# Patient Record
Sex: FEMALE | Race: WHITE | NOT HISPANIC OR LATINO | Employment: FULL TIME | URBAN - METROPOLITAN AREA
[De-identification: names, ages, dates, MRNs, and addresses within clinical notes are randomized per-mention and may not be internally consistent; named-entity substitution may affect disease eponyms.]

---

## 2017-02-14 DIAGNOSIS — Z30.40 CONTRACEPTIVE USE: ICD-10-CM

## 2017-05-11 DIAGNOSIS — Z30.40 CONTRACEPTIVE USE: ICD-10-CM

## 2017-05-11 NOTE — TELEPHONE ENCOUNTER
Routed to station .  Will forward to RN and FYI to pharmacy to please route OB-GYN refill requests to Triage OB pool at p [phone] in future.    Thanks!    -Cynthia Peterson  [facility] [place]

## 2017-05-11 NOTE — TELEPHONE ENCOUNTER
Last Filled 02/14/17  Last Qty 84  Last Office Visit 11/30/16    Thanks,  Alisia Alcaraz  Certified Pharmacy Technician  [facility]  [phone]

## 2017-05-11 NOTE — TELEPHONE ENCOUNTER
Last annual visit 11/30/16  BP elevated.  Last refill on BCP 2/14/17  BP Readings from Last 4 Encounters:   11/30/16 144/85   06/10/16 152/80   03/16/15 131/81   10/01/14 123/64     Please review and advise on refill  Thank you.    Aydee Mckee   [facility]  RN

## 2017-10-26 DIAGNOSIS — Z30.40 CONTRACEPTIVE USE: ICD-10-CM

## 2017-10-26 DIAGNOSIS — Z30.41 ENCOUNTER FOR SURVEILLANCE OF CONTRACEPTIVE PILLS: Primary | ICD-10-CM

## 2017-10-26 NOTE — TELEPHONE ENCOUNTER
[facility]      Last Written Prescription Date: 5-11-17  Last Fill Quantity: 84,  # refills: 1   Last Office Visit with [facility], [facility] or [facility] prescribing provider: 11-30-16    [facility]  Pharmacy [facility]  On Behalf of [facility]

## 2018-01-03 ENCOUNTER — MYC MEDICAL ADVICE (OUTPATIENT)
Dept: OBGYN | Facility: CLINIC | Age: 28
End: 2018-01-03

## 2018-01-03 DIAGNOSIS — Z30.41 ENCOUNTER FOR SURVEILLANCE OF CONTRACEPTIVE PILLS: ICD-10-CM

## 2018-09-25 ENCOUNTER — HOSPITAL ENCOUNTER (EMERGENCY)
Facility: CLINIC | Age: 28
Discharge: HOME | End: 2018-09-25
Attending: NURSE PRACTITIONER | Admitting: NURSE PRACTITIONER
Payer: COMMERCIAL

## 2018-09-25 VITALS
OXYGEN SATURATION: 97 % | SYSTOLIC BLOOD PRESSURE: 153 MMHG | HEART RATE: 74 BPM | TEMPERATURE: 97.9 F | DIASTOLIC BLOOD PRESSURE: 83 MMHG

## 2018-09-25 DIAGNOSIS — J20.9 ACUTE BRONCHITIS, UNSPECIFIED ORGANISM: ICD-10-CM

## 2018-09-25 PROCEDURE — G0463 HOSPITAL OUTPT CLINIC VISIT: HCPCS | Performed by: NURSE PRACTITIONER

## 2018-09-25 PROCEDURE — 99214 OFFICE O/P EST MOD 30 MIN: CPT | Mod: Z6 | Performed by: NURSE PRACTITIONER

## 2018-09-25 RX ORDER — PREDNISONE 20 MG/1
TABLET ORAL
Qty: 10 TABLET | Refills: 0 | Status: SHIPPED | OUTPATIENT
Start: 2018-09-25 | End: 2019-05-07

## 2018-09-25 RX ORDER — ALBUTEROL SULFATE 90 UG/1
2 AEROSOL, METERED RESPIRATORY (INHALATION) EVERY 4 HOURS PRN
Qty: 1 INHALER | Refills: 0 | Status: SHIPPED | OUTPATIENT
Start: 2018-09-25 | End: 2019-03-12

## 2018-09-25 ASSESSMENT — ENCOUNTER SYMPTOMS
CHILLS: 0
COUGH: 1
HEADACHES: 0
FATIGUE: 0
WHEEZING: 1
FEVER: 0
SHORTNESS OF BREATH: 0
ABDOMINAL PAIN: 0
SORE THROAT: 0

## 2018-09-25 NOTE — ED PROVIDER NOTES
"  History     Chief Complaint   Patient presents with     Cough     cough and wheezing     HPI  Gardenia Marcano is a 28 year old female who presents to urgent care for evaluation of cough and wheezing.  Symptoms started 1 week ago.  Patient initially thought she was getting better 2 days ago, but then states she felt like she was \"hit by a ton of bricks\" yesterday.  Denies fever chills.  Denies shortness of breath.  Reports nonproductive cough, having coughing jags.  Patient reports similar illness 2 years ago and was given a prescription for an inhaler and thought it was very helpful.  No current treatments tried.    Problem List:    Patient Active Problem List    Diagnosis Date Noted     Depression 10/01/2014     Priority: Medium     wellbutrin no help  prozac  counseling       Contraceptive use 10/01/2014     Priority: Medium     Didn't like the patch  OCPs       Elevated blood pressure reading without diagnosis of hypertension 11/20/2013     Priority: Medium        Past Medical History:    Past Medical History:   Diagnosis Date     Chickenpox        Past Surgical History:    Past Surgical History:   Procedure Laterality Date     MOUTH SURGERY      wisdom teeth       Family History:    Family History   Problem Relation Age of Onset     HEART DISEASE Mother      MI     Cerebrovascular Disease Mother      mild     Diabetes Father      HEART DISEASE Maternal Grandfather      Diabetes Paternal Grandmother      Cancer Maternal Grandmother      lung     Arthritis Sister        Social History:  Marital Status:  Single [1]  Social History   Substance Use Topics     Smoking status: Never Smoker     Smokeless tobacco: Never Used     Alcohol use Yes      Comment: occas- quit with pregnancy        Medications:      albuterol (PROAIR HFA) 108 (90 Base) MCG/ACT inhaler   predniSONE (DELTASONE) 20 MG tablet   norethindrone-ethinyl estradiol (ORTHO-NOVUM 1-35 TAB,NORTREL 1-35 TAB) 1-35 MG-MCG per tablet   orlistat (XENICAL) 120 " MG capsule   [DISCONTINUED] albuterol (ALBUTEROL) 108 (90 BASE) MCG/ACT inhaler         Review of Systems   Constitutional: Negative for chills, fatigue and fever.   HENT: Positive for congestion. Negative for ear pain and sore throat.    Respiratory: Positive for cough and wheezing. Negative for shortness of breath.    Cardiovascular: Negative for chest pain.   Gastrointestinal: Negative for abdominal pain.   Neurological: Negative for headaches.       Physical Exam   BP: 153/83  Pulse: 74  Temp: 97.9  F (36.6  C)  SpO2: 97 %      Physical Exam    GENERAL APPEARANCE: healthy, alert and no distress  EYES: EOMI, conjunctiva clear  HENT: bilateral ear canals clear, intact, and without inflammation. Right TM normal. Left TM normal. Nose normal.  Oropharynx without ulcers, erythema or lesions  NECK: supple, nontender, no lymphadenopathy  RESP: Bilateral expiratory wheezing throughout.  No rales or rhonchi.  No tachypnea.  No hypoxia.  CV: regular rates and rhythm, normal S1 S2, no murmur noted      ED Course     ED Course     Procedures             No results found for this or any previous visit (from the past 24 hour(s)).    Medications - No data to display    Assessments & Plan (with Medical Decision Making)   History and exam are consistent with a bronchitis.  Likely viral course of illness.  She is a non-smoker.  No fever.  No evidence for pneumonia or need for further imaging.  Patient will be treated with prednisone and an albuterol inhaler for wheezing.  Instructed to stay well-hydrated.  Symptomatic treatment.  Return for fever, chest pain, shortness of breath, or persistent symptoms greater than 5-7 more days.  I have reviewed the nursing notes.    I have reviewed the findings, diagnosis, plan and need for follow up with the patient.      Discharge Medication List as of 9/25/2018  2:39 PM      START taking these medications    Details   predniSONE (DELTASONE) 20 MG tablet Take two tablets (= 40mg) each day for  5 (five) days, Disp-10 tablet, R-0, E-Prescribe             Final diagnoses:   Acute bronchitis, unspecified organism       9/25/2018   [facility] EMERGENCY DEPARTMENT     Mavis Obrien APRN CNP  09/25/18 0512

## 2018-09-25 NOTE — ED AVS SNAPSHOT
[facility]    [address]    [facility] [address]    Phone:  [phone]    Fax:  [phone]                                       Gardenia Marcano   MRN: [id number]    Department:  [facility]   Date of Visit:  9/25/2018           Patient Information     Date Of Birth          1990        Your diagnoses for this visit were:     Acute bronchitis, unspecified organism        You were seen by Mavis Obrien APRN CNP.      Follow-up Information     Follow up with Gardenia Curry DO.    Specialty:  OB/Gyn    Why:  As needed    Contact information:    [address] [address]  [phone]          Discharge Instructions         Prednisone 40 mg daily.  Albuterol inhaler 2 puffs every 4 hours as needed for wheezing, chest tightness, or shortness of breath.  Return for fever, difficulty breathing, chest pain, or persistent symptoms longer than 5-7 more days.    Viral Bronchitis (Adult)    You have a viral bronchitis. Bronchitis is inflammation and swelling of the lining of the lungs. This is often caused by an infection. Symptoms include a dry, hacking cough that is worse at night. The cough may bring up yellow-green mucus. You may also feel short of breath or wheeze. Other symptoms may include tiredness, chest discomfort, and chills.  Bronchitis that is caused by a virus is not treated with antibiotics. Instead, medicines may be given to help relieve symptoms. Symptoms can last up to 2 weeks, although the cough may last much longer.  This illness is contagious during the first few days and is spread through the air by coughing and sneezing, or by direct contact (touching the sick person and then touching your own eyes, nose, or mouth).  Most viral illnesses resolve within 10 to 14 days with rest and simple home remedies, although they may sometimes last for several weeks.  Home care    If  symptoms are severe, rest at home for the first 2 to 3 days. When you go back to your usual activities, don't let yourself get too tired.    Do not smoke. Also avoid being exposed to secondhand smoke.    You may use over-the-counter medicine to control fever or pain, unless another pain medicine was prescribed. If you have chronic liver or kidney disease or have ever had a stomach ulcer or gastrointestinal bleeding, talk with your healthcare provider before using these medicines. Also talk to your provider if you are taking medicine to prevent blood clots. Aspirin should never be given to anyone younger than 18 years of age who is ill with a viral infection or fever. It may cause severe liver or brain damage.    Your appetite may be poor, so a light diet is fine. Avoid dehydration by drinking 6 to 8 glasses of fluids per day (such as water, soft drinks, sports drinks, juices, tea, or soup). Extra fluids will help loosen secretions in the nose and lungs.    Over-the-counter cough, cold, and sore-throat medicines will not shorten the length of the illness, but they may help to reduce symptoms. Don't use decongestants if you have high blood pressure.  Follow-up care  Follow up with your healthcare provider, or as advised. If you had an X-ray or ECG (electrocardiogram), a specialist will review it. You will be notified of any new findings that may affect your care.  If you are age 65 or older, or if you have a chronic lung disease or condition that affects your immune system, or you smoke, ask your healthcare provider about getting a pneumococcal vaccine and a yearly flu shot (influenza vaccine).  When to seek medical advice  Call your healthcare provider right away if any of these occur:    Fever of 100.4 F (38 C) or higher, or as directed by your healthcare provider    Coughing up increased amounts of colored sputum    Weakness, drowsiness, headache, facial pain, ear pain, or a stiff neck  Call 911  Call 911 if any of  these occur:    Coughing up blood    Worsening weakness, drowsiness, headache, or stiff neck    Trouble breathing, wheezing, or pain with breathing  Date Last Reviewed: 9/13/2015 2000-2017 [employer]. [address] [place] [address], [place] [address]. All rights reserved. This information is not intended as a substitute for professional medical care. Always follow your healthcare professional's instructions.          24 Hour Appointment Hotline       To make an appointment at any [facility] clinic, call [phone] ([phone]). If you don't have a family doctor or clinic, we will help you find one. [facility] clinics are conveniently located to serve the needs of you and your family.             Review of your medicines      START taking        Dose / Directions Last dose taken    predniSONE 20 MG tablet   Commonly known as:  DELTASONE   Quantity:  10 tablet        Take two tablets (= 40mg) each day for 5 (five) days   Refills:  0          Our records show that you are taking the medicines listed below. If these are incorrect, please call your family doctor or clinic.        Dose / Directions Last dose taken    albuterol 108 (90 Base) MCG/ACT inhaler   Commonly known as:  PROAIR HFA   Dose:  2 puff   Quantity:  1 Inhaler        Inhale 2 puffs into the lungs every 4 hours as needed for shortness of breath / dyspnea   Refills:  0        norethindrone-ethinyl estradiol 1-35 MG-MCG per tablet   Commonly known as:  ORTHO-NOVUM 1-35 TAB,NORTREL 1-35 TAB   Dose:  1 tablet   Quantity:  112 tablet        Take 1 tablet by mouth daily   Refills:  6        orlistat 120 MG capsule   Commonly known as:  XENICAL   Dose:  120 mg   Quantity:  90 capsule        Take 1 capsule (120 mg) by mouth 3 times daily (with meals)   Refills:  1                Prescriptions were sent or printed at these locations (2 Prescriptions)                   [facility] [place], [place] - [address]   [address],  [place] [address]    Telephone:  [phone]   Fax:  [phone]   Hours:                  E-Prescribed (2 of 2)         albuterol (PROAIR HFA) 108 (90 Base) MCG/ACT inhaler               predniSONE (DELTASONE) 20 MG tablet                Orders Needing Specimen Collection     None      Pending Results     No orders found from 9/23/2018 to 9/26/2018.            Pending Culture Results     No orders found from 9/23/2018 to 9/26/2018.            Pending Results Instructions     If you had any lab results that were not finalized at the time of your Discharge, you can call the ED Lab Result RN at [phone]. You will be contacted by this team for any positive Lab results or changes in treatment. The nurses are available 7 days a week from 10A to 6:30P.  You can leave a message 24 hours per day and they will return your call.        Test Results From Your Hospital Stay               Thank you for choosing [facility]       Thank you for choosing [facility] for your care. Our goal is always to provide you with excellent care. Hearing back from our patients is one way we can continue to improve our services. Please take a few minutes to complete the written survey that you may receive in the mail after you visit with us. Thank you!        [email or web address] Information     [email or web address] gives you secure access to your electronic health record. If you see a primary care provider, you can also send messages to your care team and make appointments. If you have questions, please call your primary care clinic.  If you do not have a primary care provider, please call [phone] and they will assist you.        Care EveryWhere ID     This is your Care EveryWhere ID. This could be used by other organizations to access your [facility] medical records  [id number]        Equal Access to Services     JOVITA CANNON : aleshia Jerry, mirna gonsalves. So [facility]  [phone].    ATENCIÓN: Si habla español, tiene a goff disposición servicios gratuitos de asistencia lingüística. Llame al [phone].    We comply with applicable federal civil rights laws and [place] laws. We do not discriminate on the basis of race, color, national origin, age, disability, sex, sexual orientation, or gender identity.            After Visit Summary       This is your record. Keep this with you and show to your community pharmacist(s) and doctor(s) at your next visit.

## 2018-09-25 NOTE — ED AVS SNAPSHOT
[facility]    [address] [facility] [address] [address]    Phone:  [phone]    Fax:  [phone]                                       Gardenia Marcano   MRN: [id number]    Department:  [facility]   Date of Visit:  9/25/2018           After Visit Summary Signature Page     I have received my discharge instructions, and my questions have been answered. I have discussed any challenges I see with this plan with the nurse or doctor.    ..........................................................................................................................................  Patient/Patient Representative Signature      ..........................................................................................................................................  Patient Representative Print Name and Relationship to Patient    ..................................................               ................................................  Date                                   Time    ..........................................................................................................................................  Reviewed by Signature/Title    ...................................................              ..............................................  Date                                               Time          22EPIC Rev 08/18

## 2018-09-25 NOTE — DISCHARGE INSTRUCTIONS
Prednisone 40 mg daily.  Albuterol inhaler 2 puffs every 4 hours as needed for wheezing, chest tightness, or shortness of breath.  Return for fever, difficulty breathing, chest pain, or persistent symptoms longer than 5-7 more days.    Viral Bronchitis (Adult)    You have a viral bronchitis. Bronchitis is inflammation and swelling of the lining of the lungs. This is often caused by an infection. Symptoms include a dry, hacking cough that is worse at night. The cough may bring up yellow-green mucus. You may also feel short of breath or wheeze. Other symptoms may include tiredness, chest discomfort, and chills.  Bronchitis that is caused by a virus is not treated with antibiotics. Instead, medicines may be given to help relieve symptoms. Symptoms can last up to 2 weeks, although the cough may last much longer.  This illness is contagious during the first few days and is spread through the air by coughing and sneezing, or by direct contact (touching the sick person and then touching your own eyes, nose, or mouth).  Most viral illnesses resolve within 10 to 14 days with rest and simple home remedies, although they may sometimes last for several weeks.  Home care    If symptoms are severe, rest at home for the first 2 to 3 days. When you go back to your usual activities, don't let yourself get too tired.    Do not smoke. Also avoid being exposed to secondhand smoke.    You may use over-the-counter medicine to control fever or pain, unless another pain medicine was prescribed. If you have chronic liver or kidney disease or have ever had a stomach ulcer or gastrointestinal bleeding, talk with your healthcare provider before using these medicines. Also talk to your provider if you are taking medicine to prevent blood clots. Aspirin should never be given to anyone younger than 18 years of age who is ill with a viral infection or fever. It may cause severe liver or brain damage.    Your appetite may be poor, so a light diet  is fine. Avoid dehydration by drinking 6 to 8 glasses of fluids per day (such as water, soft drinks, sports drinks, juices, tea, or soup). Extra fluids will help loosen secretions in the nose and lungs.    Over-the-counter cough, cold, and sore-throat medicines will not shorten the length of the illness, but they may help to reduce symptoms. Don't use decongestants if you have high blood pressure.  Follow-up care  Follow up with your healthcare provider, or as advised. If you had an X-ray or ECG (electrocardiogram), a specialist will review it. You will be notified of any new findings that may affect your care.  If you are age 65 or older, or if you have a chronic lung disease or condition that affects your immune system, or you smoke, ask your healthcare provider about getting a pneumococcal vaccine and a yearly flu shot (influenza vaccine).  When to seek medical advice  Call your healthcare provider right away if any of these occur:    Fever of 100.4 F (38 C) or higher, or as directed by your healthcare provider    Coughing up increased amounts of colored sputum    Weakness, drowsiness, headache, facial pain, ear pain, or a stiff neck  Call 911  Call 911 if any of these occur:    Coughing up blood    Worsening weakness, drowsiness, headache, or stiff neck    Trouble breathing, wheezing, or pain with breathing  Date Last Reviewed: 9/13/2015 2000-2017 [employer]. [address] [place] [address]. All rights reserved. This information is not intended as a substitute for professional medical care. Always follow your healthcare professional's instructions.

## 2019-03-09 DIAGNOSIS — J20.9 ACUTE BRONCHITIS, UNSPECIFIED ORGANISM: Primary | ICD-10-CM

## 2019-03-11 NOTE — TELEPHONE ENCOUNTER
11/30/16 last office visit with Dr. Roca  Patient in need of refill on her inhaler.  Future appointment currently not scheduled.    Will send to on call provider for consideration. Outside of RN guidelines for refill.    Recommend patient follow up with PCP for additional refills and yearly physical.    Aydee Mckee   [facility] Clinic  RN

## 2019-03-12 RX ORDER — ALBUTEROL SULFATE 90 UG/1
2 AEROSOL, METERED RESPIRATORY (INHALATION) EVERY 4 HOURS PRN
Qty: 1 INHALER | Refills: 0 | Status: SHIPPED | OUTPATIENT
Start: 2019-03-12 | End: 2021-01-12

## 2019-03-12 NOTE — TELEPHONE ENCOUNTER
Will prescribe refill but she will need to be seen by her PCP for an annual exam to further manage this medical problem. Thanks.     Elliott Zhou MD   [facility]

## 2019-05-07 ENCOUNTER — OFFICE VISIT (OUTPATIENT)
Dept: FAMILY MEDICINE | Facility: CLINIC | Age: 29
End: 2019-05-07
Payer: COMMERCIAL

## 2019-05-07 VITALS
HEART RATE: 99 BPM | SYSTOLIC BLOOD PRESSURE: 134 MMHG | HEIGHT: 65 IN | BODY MASS INDEX: 40.32 KG/M2 | RESPIRATION RATE: 16 BRPM | OXYGEN SATURATION: 99 % | TEMPERATURE: 98.2 F | WEIGHT: 242 LBS | DIASTOLIC BLOOD PRESSURE: 68 MMHG

## 2019-05-07 DIAGNOSIS — Z00.00 ROUTINE GENERAL MEDICAL EXAMINATION AT A HEALTH CARE FACILITY: Primary | ICD-10-CM

## 2019-05-07 DIAGNOSIS — E66.01 MORBID OBESITY (H): ICD-10-CM

## 2019-05-07 DIAGNOSIS — Z30.41 ENCOUNTER FOR SURVEILLANCE OF CONTRACEPTIVE PILLS: ICD-10-CM

## 2019-05-07 PROCEDURE — G0476 HPV COMBO ASSAY CA SCREEN: HCPCS | Performed by: FAMILY MEDICINE

## 2019-05-07 PROCEDURE — 99395 PREV VISIT EST AGE 18-39: CPT | Performed by: FAMILY MEDICINE

## 2019-05-07 PROCEDURE — 87624 HPV HI-RISK TYP POOLED RSLT: CPT | Performed by: FAMILY MEDICINE

## 2019-05-07 PROCEDURE — G0145 SCR C/V CYTO,THINLAYER,RESCR: HCPCS | Performed by: FAMILY MEDICINE

## 2019-05-07 ASSESSMENT — MIFFLIN-ST. JEOR: SCORE: 1810.64

## 2019-05-07 NOTE — PATIENT INSTRUCTIONS
Plan next physical in one year for birth control refills to do fasting labs for cholesterol and blood sugar.    Get up a few times a day at work and walk or do steps for 10 mins.  Family activity in the evening: like dance/workout kids [employer], [employer].      Preventive Health Recommendations  Female Ages 26 - 39  Yearly exam:   See your health care provider every year in order to    Review health changes.     Discuss preventive care.      Review your medicines if you your doctor has prescribed any.    Until age 30: Get a Pap test every three years (more often if you have had an abnormal result).    After age 30: Talk to your doctor about whether you should have a Pap test every 3 years or have a Pap test with HPV screening every 5 years.   You do not need a Pap test if your uterus was removed (hysterectomy) and you have not had cancer.  You should be tested each year for STDs (sexually transmitted diseases), if you're at risk.   Talk to your provider about how often to have your cholesterol checked.  If you are at risk for diabetes, you should have a diabetes test (fasting glucose).  Shots: Get a flu shot each year. Get a tetanus shot every 10 years.   Nutrition:     Eat at least 5 servings of fruits and vegetables each day.    Eat whole-grain bread, whole-wheat pasta and brown rice instead of white grains and rice.    Get adequate Calcium and Vitamin D.     Lifestyle    Exercise at least 150 minutes a week (30 minutes a day, 5 days of the week). This will help you control your weight and prevent disease.    Limit alcohol to one drink per day.    No smoking.     Wear sunscreen to prevent skin cancer.    See your dentist every six months for an exam and cleaning.    Patient Education   One pound is 3500 calories.  So to lose one pound per week need to cut out 500 calories per day or 3500 calories per week.  Do not count exercise toward or against your calories.  Write down everything that you eat  and count calories or use an silvana like Lose It or website like Spring.me or MyPlate or Calorie Chris    1200- 1500 Calorie Menu: This is a well balanced healthy 1500 calorie menu so you can follow it for as long as you need!    Day 1    Breakfast - 2 whole grain toast, 1 tablespoon of jelly, 1 teaspoon of butter, 1 cup of tea or coffee,   cup of orange juice.  Snack -   bagel, 1 cup of yogurt.  Lunch - 1 oz of sliced turkey or chicken breast, 1 tossed vegetable salad with 1 tablespoon of olive oil and lemon juice, 1 whole grain roll.  Snack -   cup of fresh strawberries, 1 cup of yogurt, 1 tablespoon of granola cereal.  Dinner - 3 oz of beef, grilled or broiled, 1 cup of rice, 1 teaspoon of butter,   cup of steamed carrots, mixed green salad with olive oil and lemon juice.  Snack - 1 apple or orange.    Day 2    Breakfast - 1 orange, 1 cup of whole grain cereal, 1 cup of milk, 1 cup of strawberries.  Snack - 2 teaspoons of peanut butter, 2 rice cakes.  Lunch - 1 cup of vegetable soup, 1 oz of mozzarella cheese, 1 mixed greens salad with olive oil and lemon juice, 1 cup of yogurt, few whole grain crackers.  Snack - 1 apple.  Dinner - 5 oz of white fish, baked, broiled or grilled, 1 baked potato, 1 cup of steamed broccoli, mixed greens salad with oil and lemon juice, 1 whole grain roll.  Snack - 3 cups of plain popcorn.    Day 3    Breakfast - 2 pancakes with 1 tablespoon of maple syrup or fruit spread.  Snack - 1 cup of milk, 1 peach.  Lunch - 6 oz of fish, grilled or broiled, mixed greens salad with 1 tablespoon of olive oil and lemon juice, 1 apple, and few whole grain crackers.  Snack - 1 granola bar.  Dinner - 2 cup of whole grain pasta,   cup of tomato sauce, mixed greens salad with olive oil and lemon juice.  Snack - 1 cup of milk, few peanuts.    Day 4    Breakfast -   cup of oatmeal, cooked with 1 teaspoons of brown sugar, 1 cup of milk, 1 orange.  Snack - 1 apple, 2 oz of almonds.  Lunch - 1 oz of sliced  chicken or turkey breast, 1 teaspoon of mustard, 1 slices of whole wheat bread, 2 slices of tomato, 1   cup of sliced raw vegetables.  Snack -   cup of milk, 1 cup of strawberries.  Dinner - 3 oz skinless chicken breast, baked, broiled or grilled, 1 medium baked potato, 2 teaspoons of butter, mixed greens salad with 1 tablespoon of olive oil and lemon juice.  Snack - 1/2 cup of cottage cheese, 1 pear.    Day 5    Breakfast - 1 whole wheat bagel, 1 table spoon of cream cheese, 1 cup of orange juice.  Snack - 1 cup of yogurt, 1 apple.  Lunch - 2 oz lean hamburger, grilled or broiled, 1 cup of steamed asparagus, large tossed vegetable salad with yogurt dressing,   cup of cottage cheese.  Dinner - 2 cup of pasta with 3 oz of cooked shrimp and 1 /2 cup of broccoli, 1 slice of Italian bread, 1 teaspoon of garlic olive oil, mixed greens salad with oil and vinegar.    Day 6    Breakfast - 1 poached egg, 1 tomato, 1 whole wheat muffin,   grapefruit.  Snack - 1 cup of fruit salad, 1 cup of yogurt, 1 granola bar.  Lunch - 3 oz of sliced turkey or chicken breast, 1 anabell bread, 1 cup of sliced carrots and celery.  Snack - 1 peach,   cup of cottage cheese.  Dinner - 3 oz of cheese, few crackers, 1 mixed greens salad with olive oil and lemon juice, 1 glass of dry red wine.  Snack - 1 cup of fresh strawberries.    Day 7    Breakfast - 1 whole wheat toast, 1 hard boiled egg,   cup of blueberries, 1 cup of yogurt.  Snack - 1 pear, 1 oz of pretzels.  Lunch - 4 oz of cheese, 1 large vegetable salad with olive oil and lemon juice, 1 cup of milk.  Snack -   cup of fruit salad, 1 granola bar.  Dinner - 3 oz of broiled or baked white fish, 1   cup of rice, 1 cup of steamed vegetables, 2 teaspoons of butter.        Understanding USDA MyPlate  The USDA (U.S. Department of Agriculture) has guidelines to help you make healthy food choices. These are called MyPlate. MyPlate shows the food groups that make up healthy meals using the image of a  place setting. Before you eat, think about the healthiest choices for what to put onto your plate or into your cup or bowl. To learn more about building a healthy plate, visit www.choosemyplate.gov.    The food groups    Fruits. Any fruit or 100% fruit juice counts as part of the Fruit Group. Fruits may be fresh, canned, frozen, or dried, and may be whole, cut-up, or pureed. Make half your plate fruits and vegetables.    Vegetables. Any vegetable or 100% vegetable juice counts as a member of the Vegetable Group. Vegetables may be fresh, frozen, canned, or dried. They can be served raw or cooked and may be whole, cut-up, or mashed. Make half your plate fruits and vegetables.    Grains. All foods made from grains are part of the Grains Group. These include wheat, rice, oats, cornmeal, and barley such as bread, pasta, oatmeal, cereal, tortillas, and grits. Grains should be no more than a quarter of your plate. At least half of your grains should be whole grains.    Protein. This group includes meat, poultry, seafood, beans and peas, eggs, processed soy products (like tofu), nuts (including nut butters), and seeds. Make protein choices no more than a quarter of your plate. Meat and poultry choices should be lean or low fat.    Dairy. All fluid milk products and foods made from milk that contain calcium, like yogurt and cheese, are part of the Dairy Group. (Foods that have little calcium, such as cream, butter, and cream cheese, are not part of the group.) Most dairy choices should be low-fat or fat-free.    Oils. These are fats that are liquid at room temperature. They include canola, corn, olive, soybean, and sunflower oil. Foods that are mainly oil include mayonnaise, certain salad dressings, and soft margarines. You should have only 5 to 7 teaspoons of oils a day. You probably already get this much from the food you eat.  Date Last Reviewed: 8/1/2017 2000-2018 [employer]. [address] [place] [address],  "[place] [address]. All rights reserved. This information is not intended as a substitute for professional medical care. Always follow your healthcare professional's instructions.           Patient Education     MyPlate Worksheet: 1,200 Calories  Your calorie needs are about 1,200 calories a day. Below are the U.S. Department of Agriculture (USDA) guidelines for your daily recommended amount of each food group.  Vegetables  1  cups Fruits  1 cup Grains  4 ounces Dairy  2  cups Protein  3 ounces   Eat a variety of vegetables each day.  Aim for these amounts each week:    1 cup dark green vegetables    3 cups red or orange-colored vegetables      cup dry beans and peas    3  cups starchy vegetables    2  cups other vegetables Eat a variety of fruits each day.  Go easy on fruit juices.  Good choices of fruits include:    Berries    Bananas    Apples    Melon    Dried fruit    Frozen fruit    Canned fruit Choose whole grains whenever you can.  Aim to eat at least 2 ounces of whole grains each day:    Bread    Cereal    Rice    Pasta    Potatoes    Tortillas Choose low-fat or fat-free milk, yogurt, or cheese each day.  Good choices include:    Low-fat or fat-free milk or chocolate milk    Low-fat or fat-free yogurt    Low-fat or fat-free cottage cheese or other reduced-fat cheeses    Calcium-fortified milk alternatives Choose low-fat or lean meats, poultry, fish and seafood each day.  Vary your protein. Choose more:    Fish and other seafood    Lean low-fat meat and poultry    Eggs    Beans, peas    Tofu    Unsalted nuts and seeds  Choose less high-fat and red meat.   Source: USDA MyPlate, www.choosemyplate.gov  Know your limits on oils (fats) and sugars    Your allowance for oils is 17 grams or about 4 teaspoons a day (oil includes vegetable oil, mayonnaise, soft margarine, salad dressing, nuts, olives, avocados, and some fish).    Limit the extras (solid fats and sugars, also called \"empty calories\") to 100 calories a " day.    Cut back on salt (sodium). Stay under 2,300 mg sodium a day. If you have a health condition such as heart disease or high blood pressure, your doctor will likely tell you to limit sodium to no more than 1,500 mg a day.  Get moving and be active!  Aim for at least 30 minutes of physical activity most days of the week or 150 minutes of exercise a week.  MyPlate Servings Worksheet: 1,200 calories  This worksheet tells you how many servings you should get each day from each food group, and tells you how much food makes a serving. Use this as a guide as you plan your meals throughout the day. Track your progress daily by writing in what you actually ate.  Food Group  Daily MyPlate Goal  What You Ate Today    Vegetables 3 Half-cups or 3 Servings  One serving is:    cup cut-up raw or cooked vegetables  1 cup raw, leafy vegetables    baked sweet potato    cup vegetable juice  Note: At meals, fill half your plate with vegetables and fruit and eat them first.     Fruits 2 Half-cups or 2 Servings  One serving is:    cup fresh, frozen, or canned fruit  1 medium piece of fruit  1 cup of berries or melon    cup dried fruit    cup 100% fruit juice  Note: Make most choices fruit instead of juice.     Grains 4 Servings or 4 Ounces  One serving is:  1 slice bread  1 cup dry cereal    cup cooked rice, pasta, or cereal  1 5-inch tortilla  Note: Choose whole grains for at least half of your servings each day.     Dairy 2 Servings or 2 Cups  One serving is:  1 cup milk  1  ounces reduced-fat hard cheese  2 ounces processed cheese  1 cup low-fat yogurt  1/3 cup shredded cheese  Note: Choose low-fat or fat-free most often.     Protein 3 Servings or 3 Ounces  One serving is:  1 ounce cooked lean beef, pork, lamb, or ham  1 ounce cooked chicken or turkey (no skin)  1 ounce cooked fish or shellfish (not fried)  1 egg    cup egg substitute    ounce nuts or seeds  1 tablespoon peanut or almond butter    cup cooked dry beans or peas     cup tofu  2 tablespoons [facility]     Date Last Reviewed: 7/1/2017 2000-2018 [employer]. [address] [place] [address], [place] [address]. All rights reserved. This information is not intended as a substitute for professional medical care. Always follow your healthcare professional's instructions.

## 2019-05-07 NOTE — PROGRESS NOTES
SUBJECTIVE:   CC: Gardenia Marcano is an 28 year old woman who presents for preventive health visit.     Healthy Habits:    Do you get at least three servings of calcium containing foods daily (dairy, green leafy vegetables, etc.)? No- sometimes    Amount of exercise or daily activities, outside of work: 0 day(s) per week. Chases her 4 year old around.    Problems taking medications regularly No    Medication side effects: No    Have you had an eye exam in the past two years? no    Do you see a dentist twice per year? no    Do you have sleep apnea, excessive snoring or daytime drowsiness?no      Today's PHQ-2 Score:   PHQ-2 ( 1999 Pfizer) 11/30/2016 8/5/2014   Q1: Little interest or pleasure in doing things 0 2   Q2: Feeling down, depressed or hopeless 0 2   PHQ-2 Score 0 4       Abuse: Current or Past(Physical, Sexual or Emotional)- No  Do you feel safe in your environment? Yes    Social History     Tobacco Use     Smoking status: Never Smoker     Smokeless tobacco: Never Used   Substance Use Topics     Alcohol use: Yes     Comment: occas- quit with pregnancy     If you drink alcohol do you typically have >3 drinks per day or >7 drinks per week? Not Applicable                     Reviewed orders with patient.  Reviewed health maintenance and updated orders accordingly - Yes  BP Readings from Last 3 Encounters:   05/07/19 134/68   09/25/18 153/83   11/30/16 144/85    Wt Readings from Last 3 Encounters:   05/07/19 109.8 kg (242 lb)   11/30/16 110.8 kg (244 lb 3.2 oz)   03/16/15 113.6 kg (250 lb 6.4 oz)                    Mammogram not appropriate for this patient based on age.    Pertinent mammograms are reviewed under the imaging tab.  History of abnormal Pap smear: NO - age 30-65 PAP every 5 years with negative HPV co-testing recommended  PAP / HPV 11/30/2016 11/20/2013   PAP NIL NIL     Reviewed and updated as needed this visit by clinical staff  Tobacco  Allergies  Meds  Med Hx  Surg Hx  Fam Hx  Soc Hx     "    Reviewed and updated as needed this visit by Provider            ROS:  CONSTITUTIONAL: NEGATIVE for fever, chills, change in weight  INTEGUMENTARU/SKIN: NEGATIVE for worrisome rashes, moles or lesions  EYES: NEGATIVE for vision changes or irritation  ENT: NEGATIVE for ear, mouth and throat problems  RESP: NEGATIVE for significant cough or SOB  BREAST: NEGATIVE for masses, tenderness or discharge  CV: NEGATIVE for chest pain, palpitations or peripheral edema  GI: NEGATIVE for nausea, abdominal pain, heartburn, or change in bowel habits  : NEGATIVE for unusual urinary or vaginal symptoms. No periods with taking continuous birth control.  MUSCULOSKELETAL: NEGATIVE for significant arthralgias or myalgia  NEURO: NEGATIVE for weakness, dizziness or paresthesias  PSYCHIATRIC: NEGATIVE for changes in mood or affect    OBJECTIVE:   /68   Pulse 99   Temp 98.2  F (36.8  C) (Tympanic)   Resp 16   Ht 1.638 m (5' 4.5\")   Wt 109.8 kg (242 lb)   SpO2 99%   BMI 40.90 kg/m    EXAM:  GENERAL: healthy, alert and no distress  EYES: Eyes grossly normal to inspection, PERRL and conjunctivae and sclerae normal  HENT: ear canals and TM's normal, nose and mouth without ulcers or lesions  NECK: no adenopathy, no asymmetry, masses, or scars and thyroid normal to palpation  RESP: lungs clear to auscultation - no rales, rhonchi or wheezes  BREAST: normal without masses, tenderness or nipple discharge and no palpable axillary masses or adenopathy  CV: regular rate and rhythm, normal S1 S2, no S3 or S4, no murmur, click or rub, no peripheral edema and peripheral pulses strong  ABDOMEN: soft, nontender, no hepatosplenomegaly, no masses and bowel sounds normal   (female): normal female external genitalia, normal urethral meatus, vaginal mucosa pink, moist, well rugated, and normal cervix/adnexa/uterus without masses or discharge  MS: no gross musculoskeletal defects noted, no edema  SKIN: no suspicious lesions or rashes  NEURO: " "Normal strength and tone, mentation intact and speech normal  PSYCH: mentation appears normal, affect normal/bright    Diagnostic Test Results:  none     ASSESSMENT/PLAN:   Gardenia was seen today for physical.    Diagnoses and all orders for this visit:    Routine general medical examination at a health care facility  -     Pap imaged thin layer screen with HPV - recommended age 30 - 65 years (select HPV order below)  -     HPV High Risk Types DNA Cervical    Encounter for surveillance of contraceptive pills  -     norethindrone-ethinyl estradiol (ORTHO-NOVUM 1-35 TAB,NORTREL 1-35 TAB) 1-35 MG-MCG tablet; Take 1 tablet by mouth daily Skip placebo.    Morbid obesity (H): discussed.        COUNSELING:   Reviewed preventive health counseling, as reflected in patient instructions       Regular exercise       Healthy diet/nutrition       Vision screening       Contraception       Safe sex practices/STD prevention    BP Readings from Last 1 Encounters:   05/07/19 134/68     Estimated body mass index is 40.9 kg/m  as calculated from the following:    Height as of this encounter: 1.638 m (5' 4.5\").    Weight as of this encounter: 109.8 kg (242 lb).    BP Screening:   Last 3 BP Readings:    BP Readings from Last 3 Encounters:   05/07/19 134/68   09/25/18 153/83   11/30/16 144/85       The following was recommended to the patient:  Re-screen BP within a year and recommended lifestyle modifications  Weight management plan: Discussed healthy diet and exercise guidelines     reports that she has never smoked. She has never used smokeless tobacco.      Counseling Resources:  ATP IV Guidelines  Pooled Cohorts Equation Calculator  Breast Cancer Risk Calculator  FRAX Risk Assessment  ICSI Preventive Guidelines  Dietary Guidelines for Americans, 2010  USDA's MyPlate  ASA Prophylaxis  Lung CA Screening    Eb Suarez MD  [facility]  "

## 2019-05-09 LAB
COPATH REPORT: NORMAL
PAP: NORMAL

## 2019-05-14 LAB
FINAL DIAGNOSIS: NORMAL
HPV HR 12 DNA CVX QL NAA+PROBE: NEGATIVE
HPV16 DNA SPEC QL NAA+PROBE: NEGATIVE
HPV18 DNA SPEC QL NAA+PROBE: NEGATIVE
SPECIMEN DESCRIPTION: NORMAL
SPECIMEN SOURCE CVX/VAG CYTO: NORMAL

## 2019-08-17 ENCOUNTER — MYC REFILL (OUTPATIENT)
Dept: FAMILY MEDICINE | Facility: CLINIC | Age: 29
End: 2019-08-17

## 2019-08-17 DIAGNOSIS — Z30.41 ENCOUNTER FOR SURVEILLANCE OF CONTRACEPTIVE PILLS: ICD-10-CM

## 2019-08-18 ENCOUNTER — MYC REFILL (OUTPATIENT)
Dept: FAMILY MEDICINE | Facility: CLINIC | Age: 29
End: 2019-08-18

## 2019-08-18 DIAGNOSIS — Z30.41 ENCOUNTER FOR SURVEILLANCE OF CONTRACEPTIVE PILLS: ICD-10-CM

## 2019-08-20 NOTE — TELEPHONE ENCOUNTER
Too soon, rx sent 5/7/19 for 1 year. Advised patient to contact pharmacy and speak to a real person.

## 2020-01-23 ENCOUNTER — MYC REFILL (OUTPATIENT)
Dept: FAMILY MEDICINE | Facility: CLINIC | Age: 30
End: 2020-01-23

## 2020-01-23 DIAGNOSIS — Z30.41 ENCOUNTER FOR SURVEILLANCE OF CONTRACEPTIVE PILLS: ICD-10-CM

## 2020-01-24 ENCOUNTER — MYC REFILL (OUTPATIENT)
Dept: FAMILY MEDICINE | Facility: CLINIC | Age: 30
End: 2020-01-24

## 2020-01-24 DIAGNOSIS — Z30.41 ENCOUNTER FOR SURVEILLANCE OF CONTRACEPTIVE PILLS: ICD-10-CM

## 2020-03-02 ENCOUNTER — HEALTH MAINTENANCE LETTER (OUTPATIENT)
Age: 30
End: 2020-03-02

## 2020-04-22 ENCOUNTER — MYC REFILL (OUTPATIENT)
Dept: FAMILY MEDICINE | Facility: CLINIC | Age: 30
End: 2020-04-22

## 2020-04-22 DIAGNOSIS — Z30.41 ENCOUNTER FOR SURVEILLANCE OF CONTRACEPTIVE PILLS: ICD-10-CM

## 2020-04-23 NOTE — TELEPHONE ENCOUNTER
"Requested Prescriptions   Pending Prescriptions Disp Refills     norethindrone-ethinyl estradiol (ORTHO-NOVUM) 1-35 MG-MCG tablet 112 tablet 4     Sig: Take 1 tablet by mouth daily Skip placebo.       Contraceptives Protocol Passed - 4/22/2020 11:45 PM        Passed - Patient is not a current smoker if age is 35 or older        Passed - Recent (12 mo) or future (30 days) visit within the authorizing provider's specialty     Patient has had an office visit with the authorizing provider or a provider within the authorizing providers department within the previous 12 mos or has a future within next 30 days. See \"Patient Info\" tab in inbasket, or \"Choose Columns\" in Meds & Orders section of the refill encounter.              Passed - Medication is active on med list        Passed - No active pregnancy on record        Passed - No positive pregnancy test in past 12 months       Hormone Replacement Therapy Passed - 4/22/2020 11:45 PM        Passed - Blood pressure under 140/90 in past 12 months     BP Readings from Last 3 Encounters:   05/07/19 134/68   09/25/18 153/83   11/30/16 144/85                 Passed - Recent (12 mo) or future (30 days) visit within the authorizing provider's specialty     Patient has had an office visit with the authorizing provider or a provider within the authorizing providers department within the previous 12 mos or has a future within next 30 days. See \"Patient Info\" tab in inbasket, or \"Choose Columns\" in Meds & Orders section of the refill encounter.              Passed - Medication is active on med list        Passed - Patient is 18 years of age or older        Passed - No active pregnancy on record        Passed - No positive pregnancy test on record in past 12 months           Last Office visit: 5/7/19    Prescription approved per [facility] [place].    "

## 2020-09-14 ENCOUNTER — MYC REFILL (OUTPATIENT)
Dept: FAMILY MEDICINE | Facility: CLINIC | Age: 30
End: 2020-09-14

## 2020-09-14 DIAGNOSIS — Z30.41 ENCOUNTER FOR SURVEILLANCE OF CONTRACEPTIVE PILLS: ICD-10-CM

## 2020-09-14 NOTE — TELEPHONE ENCOUNTER
"Requested Prescriptions   Pending Prescriptions Disp Refills     norethindrone-ethinyl estradiol (ORTHO-NOVUM) 1-35 MG-MCG tablet 112 tablet 0     Sig: Take 1 tablet by mouth daily Skip placebo.       Contraceptives Protocol Failed - 9/14/2020  2:49 PM        Failed - Recent (12 mo) or future (30 days) visit within the authorizing provider's specialty     Patient has had an office visit with the authorizing provider or a provider within the authorizing providers department within the previous 12 mos or has a future within next 30 days. See \"Patient Info\" tab in inbasket, or \"Choose Columns\" in Meds & Orders section of the refill encounter.              Passed - Patient is not a current smoker if age is 35 or older        Passed - Medication is active on med list        Passed - No active pregnancy on record        Passed - No positive pregnancy test in past 12 months       Hormone Replacement Therapy Failed - 9/14/2020  2:49 PM        Failed - Blood pressure under 140/90 in past 12 months     BP Readings from Last 3 Encounters:   05/07/19 134/68   09/25/18 153/83   11/30/16 144/85                 Failed - Recent (12 mo) or future (30 days) visit within the authorizing provider's specialty     Patient has had an office visit with the authorizing provider or a provider within the authorizing providers department within the previous 12 mos or has a future within next 30 days. See \"Patient Info\" tab in inbasket, or \"Choose Columns\" in Meds & Orders section of the refill encounter.              Passed - Medication is active on med list        Passed - Patient is 18 years of age or older        Passed - No active pregnancy on record        Passed - No positive pregnancy test on record in past 12 months             "

## 2020-09-14 NOTE — LETTER
[facility]  [address] [address]  Phone: [phone]       September 16, 2020         Gardeniaadina Marcano  [id number]  [facility] [place] [address]            Dear Gardenia:    We are concerned about your health care.  We recently provided you with medication refills.  Many medications require routine follow-up with your doctor. Dr. Suarez has stated this follow up visit can be by phone, video visit, office visit or a E-visit.    Your prescription(s) have been refilled for 3 months so you may have time for the above noted follow-up. Please call to schedule soon so we can assure you have an appointment before your next refills are needed.    Thank you,      Eb Suarez MD / Aicha BRINK RN

## 2020-09-16 ENCOUNTER — MYC REFILL (OUTPATIENT)
Dept: FAMILY MEDICINE | Facility: CLINIC | Age: 30
End: 2020-09-16

## 2020-09-16 DIAGNOSIS — Z30.41 ENCOUNTER FOR SURVEILLANCE OF CONTRACEPTIVE PILLS: ICD-10-CM

## 2020-09-16 NOTE — TELEPHONE ENCOUNTER
Please notify patient to schedule an appt for refills within 90 days.  Evisit, phone, video or clinic appt.  I sent a 90 day refill.  Thank you,  Eb Suarez MD

## 2020-10-26 ENCOUNTER — VIRTUAL VISIT (OUTPATIENT)
Dept: FAMILY MEDICINE | Facility: OTHER | Age: 30
End: 2020-10-26

## 2020-10-26 NOTE — PROGRESS NOTES
"Date: 10/26/2020 11:00:27  Clinician: José Miguel Avila  Clinician NPI: [id number]  Patient: Gardenia Marcano  Patient : 1990  Patient Address: [address] [place], [place] [address]  Patient Phone: [phone]  Visit Protocol: URI  Patient Summary:  Gardenia is a 30 year old ( : 1990 ) female who initiated a [facility] Visit for COVID-19 (Coronavirus) evaluation and screening. When asked the question \"Please sign me up to receive news, health information and promotions. \", Gardenia responded \"No\".    Gardenia states her symptoms started today.   Her symptoms consist of a headache, a cough, nasal congestion, rhinitis, facial pain or pressure, myalgia, malaise, and a sore throat. She is experiencing difficulty breathing due to nasal congestion but she is not short of breath.   Symptom details     Nasal secretions: The color of her mucus is clear.    Cough: Gardenia coughs a few times an hour and her cough is not more bothersome at night. Phlegm does not come into her throat when she coughs. She does not believe her cough is caused by post-nasal drip.     Sore throat: Gardenia reports having mild throat pain (1-3 on a 10 point pain scale), does not have exudate on her tonsils, and can swallow liquids. She is not sure if the lymph nodes in her neck are enlarged. A rash has not appeared on the skin since the sore throat started.     Facial pain or pressure: The facial pain or pressure does not feel worse when bending or leaning forward.     Headache: She states the headache is mild (1-3 on a 10 point pain scale).      Gardenia denies having ear pain, wheezing, fever, anosmia, vomiting, nausea, chills, teeth pain, ageusia, and diarrhea. She also denies taking antibiotic medication in the past month and having recent facial or sinus surgery in the past 60 days.   Precipitating events  Gardenia is not sure if she has been exposed to someone with strep throat. She has not recently been exposed to someone with influenza. " Gardenia has been in close contact with the following high risk individuals: adults 65 or older.   Pertinent COVID-19 (Coronavirus) information  In the past 14 days, Gardenia has not worked in a congregate living setting.   She does not work or volunteer as healthcare worker or a  and does not work or volunteer in a healthcare facility.   Gardenia also has not lived in a congregate living setting in the past 14 days. She does not live with a healthcare worker.   Gardenia has not had a close contact with a laboratory-confirmed COVID-19 patient within 14 days of symptom onset.   Since December 2019, Gardenia and has not had upper respiratory infection or influenza-like illness. Has not been diagnosed with lab-confirmed COVID-19 test   Pertinent medical history  Gardenia does not get yeast infections when she takes antibiotics.   Gardenia needs a return to work/school note.   Weight: 240 lbs   Gardenia smokes or uses smokeless tobacco.   She denies pregnancy and denies breastfeeding. She does not menstruate.   Weight: 240 lbs    MEDICATIONS: Dasetta 1/35 (28) oral, ALLERGIES: NKDA  Clinician Response:  Dear Gardenia,   Your symptoms show that you may have coronavirus (COVID-19). This illness can cause fever, cough and trouble breathing. Many people get a mild case and get better on their own. Some people can get very sick.  What should I do?  We would like to test you for this virus.   1. Please call [phone] to schedule your visit. Explain that you were [facility] to have a COVID-19 test. Be ready to share your [facility] visit ID number.  Please note that if you are assessed for Covid-19 testing and receive an order for testing from [facility], that the scheduling of your Covid test at [facility] may be delayed by three or four days or more due to limited availability for testing. Additional options for testing can be found on the [place] Covid-19 Response website. https://[place].gov/covid19/    The  "following will serve as your written order for this COVID Test, ordered by me, for the indication of suspected COVID [Z20.828]: The test will be ordered in [facility], our electronic health record, after you are scheduled. It will show as ordered and authorized by Jose D Hull MD.  Order: COVID-19 (Coronavirus) PCR [facility]TOMATIC [facility].   2. When it's time for your COVID test:  Stay at least 6 feet away from others. (If someone will drive you to your test, stay in the backseat, as far away from the  as you can.)   Cover your mouth and nose with a mask, tissue or washcloth.  Go straight to the testing site. Don't make any stops on the way there or back.      3.Starting now: Stay home and away from others (self-isolate) until:   You've had no fever---and no medicine that reduces fever---for one full day (24 hours). And...   Your other symptoms have gotten better. For example, your cough or breathing has improved. And...   At least 10 days have passed since your symptoms started.       During this time, don't leave the house except for testing or medical care.   Stay in your own room, even for meals. Use your own bathroom if you can.   Stay away from others in your home. No hugging, kissing or shaking hands. No visitors.  Don't go to work, school or anywhere else.    Clean \"high touch\" surfaces often (doorknobs, counters, handles, etc.). Use a household cleaning spray or wipes. You'll find a full list of  on the EPA website: www.epa.gov/pesticide-registration/list-n-disinfectants-use-against-sars-cov-2.   Cover your mouth and nose with a mask, tissue or washcloth to avoid spreading germs.  Wash your hands and face often. Use soap and water.  Caregivers in these groups are at risk for severe illness due to COVID-19:  o People 65 years and older  o People who live in a nursing home or long-term care facility  o People with chronic disease (lung, heart, cancer, diabetes, kidney, liver, immunologic)  " o People who have a weakened immune system, including those who:   Are in cancer treatment  Take medicine that weakens the immune system, such as corticosteroids  Had a bone marrow or organ transplant  Have an immune deficiency  Have poorly controlled HIV or AIDS  Are obese (body mass index of 40 or higher)  Smoke regularly   o Caregivers should wear gloves while washing dishes, handling laundry and cleaning bedrooms and bathrooms.  o Use caution when washing and drying laundry: Don't shake dirty laundry, and use the warmest water setting that you can.  o For more tips, go to www.cdc.gov/coronavirus/2019-ncov/downloads/10Things.pdf.       How can I take care of myself?   Get lots of rest. Drink extra fluids (unless a doctor has told you not to).   Take Tylenol (acetaminophen) for fever or pain. If you have liver or kidney problems, ask your family doctor if it's okay to take Tylenol.   Adults can take either:    650 mg (two 325 mg pills) every 4 to 6 hours, or...   1,000 mg (two 500 mg pills) every 8 hours as needed.    Note: Don't take more than 3,000 mg in one day. Acetaminophen is found in many medicines (both prescribed and over-the-counter medicines). Read all labels to be sure you don't take too much.   For children, check the Tylenol bottle for the right dose. The dose is based on the child's age or weight.    If you have other health problems (like cancer, heart failure, an organ transplant or severe kidney disease): Call your specialty clinic if you don't feel better in the next 2 days.       Know when to call 911. Emergency warning signs include:    Trouble breathing or shortness of breath Pain or pressure in the chest that doesn't go away Feeling confused like you haven't felt before, or not being able to wake up Bluish-colored lips or face.  Where can I get more information?    [facility] -- About COVID-19: [email or web address]   CDC -- What to Do If You're Sick:  www.cdc.gov/coronavirus/2019-ncov/about/steps-when-sick.html   CDC -- Ending Home Isolation: www.cdc.gov/coronavirus/2019-ncov/hcp/disposition-in-home-patients.html   [facility] -- Caring for Someone: www.cdc.gov/coronavirus/2019-ncov/if-you-are-sick/care-for-someone.html   [facility] -- Interim Guidance for Hospital Discharge to Home: www.[facility].[place]./diseases/coronavirus/hcp/hospdischarge.pdf   [facility] clinical trials (COVID-19 research studies): clinicalaffairs.[facility]-clinical-trials    Below are the COVID-19 hotlines at the [place] Department of Health ([facility]). Interpreters are available.    For health questions: Call 210-945-4855 or 1-184.234.1855 (7 a.m. to 7 p.m.) For questions about schools and childcare: Call 921-344-0761 or 1-467.432.2617 (7 a.m. to 7 p.m.)    Diagnosis: Contact with and (suspected) exposure to other viral communicable diseases  Diagnosis ICD: Z20.828

## 2020-10-27 DIAGNOSIS — Z20.822 SUSPECTED COVID-19 VIRUS INFECTION: Primary | ICD-10-CM

## 2020-12-20 ENCOUNTER — MYC REFILL (OUTPATIENT)
Dept: FAMILY MEDICINE | Facility: CLINIC | Age: 30
End: 2020-12-20

## 2020-12-20 ENCOUNTER — HEALTH MAINTENANCE LETTER (OUTPATIENT)
Age: 30
End: 2020-12-20

## 2020-12-20 DIAGNOSIS — Z30.41 ENCOUNTER FOR SURVEILLANCE OF CONTRACEPTIVE PILLS: ICD-10-CM

## 2020-12-22 ENCOUNTER — MYC REFILL (OUTPATIENT)
Dept: FAMILY MEDICINE | Facility: CLINIC | Age: 30
End: 2020-12-22

## 2020-12-22 DIAGNOSIS — Z30.41 ENCOUNTER FOR SURVEILLANCE OF CONTRACEPTIVE PILLS: ICD-10-CM

## 2020-12-24 ENCOUNTER — MYC REFILL (OUTPATIENT)
Dept: FAMILY MEDICINE | Facility: CLINIC | Age: 30
End: 2020-12-24

## 2020-12-24 DIAGNOSIS — Z30.41 ENCOUNTER FOR SURVEILLANCE OF CONTRACEPTIVE PILLS: ICD-10-CM

## 2020-12-24 NOTE — TELEPHONE ENCOUNTER
Routing refill request to provider for review/approval because:  Patient needs to be seen because it has been more than 1 year since last office visit - by [place] provider.    Sondra BURROUGHS MSN, RN

## 2020-12-24 NOTE — TELEPHONE ENCOUNTER
Sent 30 days.  Notify patient will need clinic or virtual appt for refills.  Thank you,  Eb Suarez MD

## 2021-01-12 ENCOUNTER — VIRTUAL VISIT (OUTPATIENT)
Dept: FAMILY MEDICINE | Facility: CLINIC | Age: 31
End: 2021-01-12
Payer: COMMERCIAL

## 2021-01-12 DIAGNOSIS — Z30.41 ENCOUNTER FOR SURVEILLANCE OF CONTRACEPTIVE PILLS: ICD-10-CM

## 2021-01-12 DIAGNOSIS — E66.01 MORBID OBESITY (H): ICD-10-CM

## 2021-01-12 PROCEDURE — 99213 OFFICE O/P EST LOW 20 MIN: CPT | Mod: 95 | Performed by: FAMILY MEDICINE

## 2021-01-12 ASSESSMENT — PATIENT HEALTH QUESTIONNAIRE - PHQ9: SUM OF ALL RESPONSES TO PHQ QUESTIONS 1-9: 1

## 2021-01-12 NOTE — PATIENT INSTRUCTIONS
"Healthy eating: check out MyPlate  Noom is a great silvana for training brain around eating or the [place] Diet Solution is a book with similar instructions.    I sent refills for the birth control for the year.      Patient Education     MyPlate Worksheet: 1,600 Calories    Your calorie needs are about 1,600 calories a day. Below are the U.S. Department of Agriculture (USDA) guidelines for your daily recommended amount of each food group.  Vegetables 2 cups Fruits 1  cups Grains 5 ounces Dairy 3 cups Protein 5 ounces   Eat a variety of vegetables each day.  Aim for these amounts each week:    1  cups dark green vegetables    4 cups red or orange-colored vegetables    1 cup dry beans and peas    4 cups starchy vegetables    3  cups other vegetables Eat a variety of fruits each day.  Go easy on fruit juices.  Good choices of fruits include:    Berries    Bananas    Apples    Melon    Dried fruit    Frozen fruit    Canned fruit Choose whole grains whenever you can.  Aim to eat at least 2  ounces of whole grains each day:    Bread    Cereal    Rice    Pasta    Potatoes    Tortillas Choose low-fat or fat-free milk, yogurt, or cheese each day.  Good choices include:    Low-fat or fat-free milk or chocolate milk    Low-fat or fat-free yogurt    Low-fat or fat-free cottage cheese or other reduced-fat cheeses    Calcium-fortified milk alternatives Choose low-fat or lean meats, poultry, fish and seafood each day.  Vary your protein. Choose more:    Fish and other seafood    Lean low-fat meat and poultry    Eggs    Beans, peas    Tofu    Unsalted nuts and seeds  Choose less high-fat and red meat.   Source: USDA MyPlate, www.choosemyplate.gov  Know your limits on oils (fats) and sugars    Your allowance for oils is 22 grams or 5 teaspoons a day (oil includes vegetable oil, mayonnaise, soft margarine, salad dressing, nuts, olives, avocados, and some fish).    Limit the extras (solid fats and sugars, also called \"empty calories\") to " 130 calories a day.    Cut back on salt (sodium). Stay under 2,300 mg sodium a day. If you have a health condition such as heart disease or high blood pressure, your doctor will likely tell you to limit sodium to no more than 1,500 mg a day.    Get moving and be active!  Aim for at least 30 minutes of physical activity most days of the week or 150 minutes of exercise a week.  MyPlate servings worksheet: 1,600 calories  This worksheet tells you how many servings you should get each day from each food group, and tells you how much food makes a serving. Use this as a guide as you plan your meals throughout the day. Track your progress daily by writing in what you actually ate.  Food Group  Daily MyPlate Goal What You Ate Today   Vegetables 4 half-cups or 4 servings  One serving is:    cup cut-up raw or cooked vegetables  1 cup raw, leafy vegetables    baked sweet potato    cup vegetable juice  Note: At meals, fill half your plate with vegetables and fruit and eat them first.    Fruits 3 half-cups or 3 servings  One serving is:    cup fresh, frozen, or canned fruit  1 medium piece of fruit  1 cup of berries or melon    cup dried fruit    cup 100% fruit juice  Note: Make most choices fruit instead of juice.    Grains 5 servings or 5 ounces  One serving is:  1 slice bread  1 cup dry cereal    cup cooked rice, pasta, or cereal  1 5-inch tortilla  Note: Choose whole grains for at least half of your servings each day.    Dairy 3 servings or 3 cups  One serving is:  1 cup milk  1  ounces reduced-fat hard cheese  2 ounces processed cheese  1 cup low-fat yogurt  1/3 cup shredded cheese  Note: Choose low-fat or fat-free most often.    Protein 5 servings or 5 ounces  One serving is:  1 ounce cooked lean beef, pork, lamb, or ham  1 ounce cooked chicken or turkey (no skin)  1 ounce cooked fish or shellfish (not fried)  1 egg    cup egg substitute    ounce nuts or seeds  1 tablespoon peanut or almond butter    cup cooked dry beans  or peas    cup tofu  2 tablespoons zaida Azul last reviewed this educational content on 7/1/2017 2000-2020 [employer]. [address] [place] [address], [place] [address]. All rights reserved. This information is not intended as a substitute for professional medical care. Always follow your healthcare professional's instructions.

## 2021-01-12 NOTE — PROGRESS NOTES
Gardenia is a 30 year old who is being evaluated via a billable video visit.      How would you like to obtain your AVS? MyChart  If the video visit is dropped, the invitation should be resent by: Text to cell phone: [phone]  Will anyone else be joining your video visit? No    Video Start Time: 10:07 AM  Assessment & Plan     Encounter for surveillance of contraceptive pills  Stable, refill  - norethindrone-ethinyl estradiol (ORTHO-NOVUM) 1-35 MG-MCG tablet; Take 1 tablet by mouth daily Skip placebo.    Morbid obesity (H): patient instructions for meal plans or apps             See Patient Instructions one year.    No follow-ups on file.    Eb Suarez MD  [facility]    Subjective     Gardenia is a 30 year old who presents to clinic today for the following health issues     HPI     Medication Followup of Birth control    Taking Medication as prescribed: yes    Side Effects:  None    Medication Helping Symptoms:  yes   Skips placebo, no issues with break through bleeding.    Review of Systems   Constitutional, HEENT, cardiovascular, pulmonary, gi and gu systems are negative, except as otherwise noted.      Objective           Vitals:  No vitals were obtained today due to virtual visit.    Physical Exam   GENERAL: Healthy, alert and no distress  EYES: Eyes grossly normal to inspection.  No discharge or erythema, or obvious scleral/conjunctival abnormalities.  RESP: No audible wheeze, cough, or visible cyanosis.  No visible retractions or increased work of breathing.    SKIN: Visible skin clear. No significant rash, abnormal pigmentation or lesions.  NEURO: Cranial nerves grossly intact.  Mentation and speech appropriate for age.  PSYCH: Mentation appears normal, affect normal/bright, judgement and insight intact, normal speech and appearance well-groomed.            Video-Visit Details    Type of service:  Video Visit    Video End Time:10:10 AM    Originating Location (pt. Location):  Home    Distant Location (provider location):  [facility]     Platform used for Video Visit: [employer]

## 2021-05-06 ENCOUNTER — IMMUNIZATION (OUTPATIENT)
Dept: FAMILY MEDICINE | Facility: CLINIC | Age: 31
End: 2021-05-06
Payer: COMMERCIAL

## 2021-05-06 PROCEDURE — 91301 PR COVID VAC MODERNA 100 MCG/0.5 ML IM: CPT

## 2021-05-06 PROCEDURE — 0011A PR COVID VAC MODERNA 100 MCG/0.5 ML IM: CPT

## 2021-06-03 ENCOUNTER — IMMUNIZATION (OUTPATIENT)
Dept: FAMILY MEDICINE | Facility: CLINIC | Age: 31
End: 2021-06-03
Attending: FAMILY MEDICINE
Payer: COMMERCIAL

## 2021-06-03 PROCEDURE — 0012A PR COVID VAC MODERNA 100 MCG/0.5 ML IM: CPT

## 2021-06-03 PROCEDURE — 91301 PR COVID VAC MODERNA 100 MCG/0.5 ML IM: CPT

## 2021-10-03 ENCOUNTER — HEALTH MAINTENANCE LETTER (OUTPATIENT)
Age: 31
End: 2021-10-03

## 2021-10-19 ENCOUNTER — OFFICE VISIT (OUTPATIENT)
Dept: FAMILY MEDICINE | Facility: CLINIC | Age: 31
End: 2021-10-19
Payer: COMMERCIAL

## 2021-10-19 VITALS
SYSTOLIC BLOOD PRESSURE: 110 MMHG | HEART RATE: 77 BPM | OXYGEN SATURATION: 98 % | DIASTOLIC BLOOD PRESSURE: 76 MMHG | BODY MASS INDEX: 39.54 KG/M2 | WEIGHT: 237.3 LBS | HEIGHT: 65 IN | TEMPERATURE: 98.5 F

## 2021-10-19 DIAGNOSIS — H92.01 OTALGIA, RIGHT: Primary | ICD-10-CM

## 2021-10-19 PROCEDURE — 99213 OFFICE O/P EST LOW 20 MIN: CPT | Performed by: NURSE PRACTITIONER

## 2021-10-19 ASSESSMENT — MIFFLIN-ST. JEOR: SCORE: 1774.33

## 2021-10-19 NOTE — PROGRESS NOTES
"    Assessment & Plan     Otalgia, right  Physical exam unremarkable  Recommend using Flonase/Nasonex-   Tylenol/NSAIDs/warm packs prn  - Otolaryngology Referral; Future    56}     No follow-ups on file.    ANDREW Crouch CNP  [facility] ANNY Varner is a 31 year old who presents for the following health issues     HPI   Chief Complaint   Patient presents with     Ear Problem     RIGHT EAR       Acute Illness  Acute illness concerns: EAR - RIGHT EAR TRAVELS INTO JAW AREA. + DRAINAGE.   Onset/Duration: 1 1/2 WEEKS AGO   Symptoms:  Fever: no  Chills/Sweats: no  Headache (location?): YES- MIGRAINE LAST WEEK   Sinus Pressure: no  Conjunctivitis:  no  Ear Pain: YES: right EAR PAIN AND DRAINAGE   Rhinorrhea: IN am   Congestion: no  Sore Throat: NEAR RIGHT EAR   Cough: no  Wheeze: no  Decreased Appetite: no  Nausea: no  Vomiting: no  Diarrhea: no  Dysuria/Freq.: no  Dysuria or Hematuria: no  Fatigue/Achiness: no  Sick/Strep Exposure: no  Therapies tried and outcome: HEAT,PAIN RELIEVER        Review of Systems   Constitutional, HEENT, cardiovascular, pulmonary, GI, , musculoskeletal, neuro, skin, endocrine and psych systems are negative, except as otherwise noted.      Objective    /76 (BP Location: Left arm, Patient Position: Chair, Cuff Size: Adult Large)   Pulse 77   Temp 98.5  F (36.9  C) (Tympanic)   Ht 1.638 m (5' 4.5\")   Wt 107.6 kg (237 lb 4.8 oz)   SpO2 98%   BMI 40.10 kg/m    Body mass index is 40.1 kg/m .  Physical Exam   GENERAL APPEARANCE: healthy, alert and no distress  HENT: ear canals and TM's normal and nose and mouth without ulcers or lesions  RESP: lungs clear to auscultation - no rales, rhonchi or wheezes  CV: regular rates and rhythm, normal S1 S2, no S3 or S4 and no murmur, click or rub                "

## 2021-11-28 ENCOUNTER — HEALTH MAINTENANCE LETTER (OUTPATIENT)
Age: 31
End: 2021-11-28

## 2022-01-23 ENCOUNTER — HEALTH MAINTENANCE LETTER (OUTPATIENT)
Age: 32
End: 2022-01-23

## 2022-01-28 DIAGNOSIS — Z30.41 ENCOUNTER FOR SURVEILLANCE OF CONTRACEPTIVE PILLS: ICD-10-CM

## 2022-01-30 ENCOUNTER — LAB (OUTPATIENT)
Dept: FAMILY MEDICINE | Facility: CLINIC | Age: 32
End: 2022-01-30
Attending: FAMILY MEDICINE
Payer: COMMERCIAL

## 2022-01-30 DIAGNOSIS — Z20.822 SUSPECTED 2019 NOVEL CORONAVIRUS INFECTION: ICD-10-CM

## 2022-01-30 PROCEDURE — U0005 INFEC AGEN DETEC AMPLI PROBE: HCPCS | Performed by: FAMILY MEDICINE

## 2022-01-30 PROCEDURE — 99207 PR NO CHARGE LOS: CPT

## 2022-01-31 LAB — SARS-COV-2 RNA RESP QL NAA+PROBE: NOT DETECTED

## 2022-01-31 RX ORDER — NORETHINDRONE AND ETHINYL ESTRADIOL 1 MG-35MCG
KIT ORAL
Qty: 112 TABLET | Refills: 2 | Status: SHIPPED | OUTPATIENT
Start: 2022-01-31 | End: 2022-12-07

## 2022-01-31 NOTE — TELEPHONE ENCOUNTER
Prescription approved per [facility] Refill Protocol.    Thank you    Martha BELTRAN RN

## 2022-09-10 ENCOUNTER — HEALTH MAINTENANCE LETTER (OUTPATIENT)
Age: 32
End: 2022-09-10

## 2022-12-03 DIAGNOSIS — Z30.41 ENCOUNTER FOR SURVEILLANCE OF CONTRACEPTIVE PILLS: ICD-10-CM

## 2022-12-06 NOTE — TELEPHONE ENCOUNTER
"Requested Prescriptions   Pending Prescriptions Disp Refills     DASETTA 1/35 1-35 MG-MCG tablet [Pharmacy Med Name: DASETTA 1/35 1-35MG-MCG TABS] 112 tablet 2     Sig: TAKE 1 TABLET BY MOUTH DAILY (SKIP PLACEBO)       Hormone Replacement Therapy Failed - 12/3/2022 10:10 AM        Failed - Blood pressure under 140/90 in past 12 months     BP Readings from Last 3 Encounters:   10/19/21 110/76   05/07/19 134/68   09/25/18 153/83                 Passed - Recent (12 mo) or future (30 days) visit within the authorizing provider's specialty     Patient has had an office visit with the authorizing provider or a provider within the authorizing providers department within the previous 12 mos or has a future within next 30 days. See \"Patient Info\" tab in inbasket, or \"Choose Columns\" in Meds & Orders section of the refill encounter.              Passed - Medication is active on med list        Passed - Patient is 18 years of age or older        Passed - No active pregnancy on record        Passed - No positive pregnancy test on record in past 12 months       Contraceptives Protocol Passed - 12/3/2022 10:10 AM        Passed - Patient is not a current smoker if age is 35 or older        Passed - Recent (12 mo) or future (30 days) visit within the authorizing provider's specialty     Patient has had an office visit with the authorizing provider or a provider within the authorizing providers department within the previous 12 mos or has a future within next 30 days. See \"Patient Info\" tab in inbasket, or \"Choose Columns\" in Meds & Orders section of the refill encounter.              Passed - Medication is active on med list        Passed - No active pregnancy on record        Passed - No positive pregnancy test in past 12 months             "

## 2022-12-07 RX ORDER — NORETHINDRONE AND ETHINYL ESTRADIOL 1 MG-35MCG
1 KIT ORAL DAILY
Qty: 112 TABLET | Refills: 0 | Status: SHIPPED | OUTPATIENT
Start: 2022-12-07 | End: 2023-03-23

## 2023-04-30 ENCOUNTER — E-VISIT (OUTPATIENT)
Dept: URGENT CARE | Facility: CLINIC | Age: 33
End: 2023-04-30
Payer: COMMERCIAL

## 2023-04-30 ENCOUNTER — HEALTH MAINTENANCE LETTER (OUTPATIENT)
Age: 33
End: 2023-04-30

## 2023-04-30 DIAGNOSIS — R21 RASH AND NONSPECIFIC SKIN ERUPTION: Primary | ICD-10-CM

## 2023-04-30 PROCEDURE — 99207 PR NON-BILLABLE SERV PER CHARTING: CPT | Performed by: NURSE PRACTITIONER

## 2023-05-01 NOTE — PATIENT INSTRUCTIONS
Dear Gardenia Marcano,    We are sorry you are not feeling well. Based on the responses you provided, it is recommended that you be seen in-person in urgent care so we can better evaluate your symptoms. Please click here to find the nearest urgent care location to you.   You will not be charged for this Visit. Thank you for trusting us with your care.    Alivia Steele NP

## 2023-05-17 ENCOUNTER — TRANSFERRED RECORDS (OUTPATIENT)
Dept: MULTI SPECIALTY CLINIC | Facility: CLINIC | Age: 33
End: 2023-05-17

## 2023-05-17 LAB — PAP SMEAR - HIM PATIENT REPORTED: NORMAL

## 2023-05-26 ASSESSMENT — ENCOUNTER SYMPTOMS
HEARTBURN: 0
PARESTHESIAS: 0
FEVER: 0
BREAST MASS: 0
HEADACHES: 0
ABDOMINAL PAIN: 0
HEMATURIA: 0
CONSTIPATION: 0
MYALGIAS: 0
CHILLS: 0
JOINT SWELLING: 0
WEAKNESS: 0
DIZZINESS: 0
DIARRHEA: 0
EYE PAIN: 0
COUGH: 0
FREQUENCY: 0
ARTHRALGIAS: 0
NAUSEA: 0
NERVOUS/ANXIOUS: 0
PALPITATIONS: 0
HEMATOCHEZIA: 0
DYSURIA: 0
SORE THROAT: 0
SHORTNESS OF BREATH: 0

## 2023-06-02 ENCOUNTER — OFFICE VISIT (OUTPATIENT)
Dept: FAMILY MEDICINE | Facility: CLINIC | Age: 33
End: 2023-06-02
Payer: COMMERCIAL

## 2023-06-02 VITALS
DIASTOLIC BLOOD PRESSURE: 82 MMHG | TEMPERATURE: 99.9 F | OXYGEN SATURATION: 98 % | SYSTOLIC BLOOD PRESSURE: 142 MMHG | WEIGHT: 256.8 LBS | BODY MASS INDEX: 42.78 KG/M2 | HEART RATE: 76 BPM | RESPIRATION RATE: 22 BRPM | HEIGHT: 65 IN

## 2023-06-02 DIAGNOSIS — Z00.00 ROUTINE GENERAL MEDICAL EXAMINATION AT A HEALTH CARE FACILITY: Primary | ICD-10-CM

## 2023-06-02 DIAGNOSIS — Z30.41 ENCOUNTER FOR SURVEILLANCE OF CONTRACEPTIVE PILLS: ICD-10-CM

## 2023-06-02 DIAGNOSIS — E66.01 MORBID OBESITY (H): ICD-10-CM

## 2023-06-02 DIAGNOSIS — Z11.59 NEED FOR HEPATITIS C SCREENING TEST: ICD-10-CM

## 2023-06-02 PROCEDURE — 99395 PREV VISIT EST AGE 18-39: CPT | Performed by: FAMILY MEDICINE

## 2023-06-02 RX ORDER — NORETHINDRONE AND ETHINYL ESTRADIOL 1 MG-35MCG
1 KIT ORAL DAILY
Qty: 112 TABLET | Refills: 4 | Status: SHIPPED | OUTPATIENT
Start: 2023-06-02 | End: 2024-06-03

## 2023-06-02 ASSESSMENT — ENCOUNTER SYMPTOMS
DIZZINESS: 0
FREQUENCY: 0
HEARTBURN: 0
HEMATURIA: 0
HEADACHES: 0
HEMATOCHEZIA: 0
MYALGIAS: 0
CONSTIPATION: 0
FEVER: 0
NAUSEA: 0
JOINT SWELLING: 0
CHILLS: 0
PALPITATIONS: 0
EYE PAIN: 0
SORE THROAT: 0
DIARRHEA: 0
ARTHRALGIAS: 0
NERVOUS/ANXIOUS: 0
COUGH: 0
DYSURIA: 0
ABDOMINAL PAIN: 0
PARESTHESIAS: 0
BREAST MASS: 0
WEAKNESS: 0
SHORTNESS OF BREATH: 0

## 2023-06-02 ASSESSMENT — PAIN SCALES - GENERAL: PAINLEVEL: NO PAIN (0)

## 2023-06-02 NOTE — PROGRESS NOTES
SUBJECTIVE:   CC: Gardenia is an 32 year old who presents for preventive health visit.       6/2/2023     3:46 PM   Additional Questions   Roomed by Brady Hull    Accompanied by self         6/2/2023     3:46 PM   Patient Reported Additional Medications   Patient reports taking the following new medications none     Healthy Habits:     Getting at least 3 servings of Calcium per day:  Yes    Bi-annual eye exam:  NO    Dental care twice a year:  NO    Sleep apnea or symptoms of sleep apnea:  None    Diet:  Regular (no restrictions)    Frequency of exercise:  2-3 days/week    Duration of exercise:  30-45 minutes    Taking medications regularly:  Yes    Medication side effects:  None    PHQ-2 Total Score: 0    Additional concerns today:  No        Today's PHQ-2 Score:       6/2/2023    10:13 AM   PHQ-2 ( 1999 [employer])   Q1: Little interest or pleasure in doing things 0   Q2: Feeling down, depressed or hopeless 0   PHQ-2 Score 0   Q1: Little interest or pleasure in doing things Not at all   Q2: Feeling down, depressed or hopeless Not at all   PHQ-2 Score 0       Have you ever done Advance Care Planning? (For example, a Health Directive, POLST, or a discussion with a medical provider or your loved ones about your wishes): No, advance care planning information given to patient to review.  Advanced care planning was discussed at today's visit.    Social History     Tobacco Use     Smoking status: Never     Smokeless tobacco: Never   Vaping Use     Vaping status: Never Used   Substance Use Topics     Alcohol use: Yes     Comment: occas- quit with pregnancy             5/26/2023     9:38 AM   Alcohol Use   Prescreen: >3 drinks/day or >7 drinks/week? Not Applicable          View : No data to display.              Reviewed orders with patient.  Reviewed health maintenance and updated orders accordingly - Yes  BP Readings from Last 3 Encounters:   06/02/23 (!) 142/82   10/19/21 110/76   05/07/19 134/68    Wt Readings from  Last 3 Encounters:   06/02/23 116.5 kg (256 lb 12.8 oz)   10/19/21 107.6 kg (237 lb 4.8 oz)   05/07/19 109.8 kg (242 lb)                    Breast Cancer Screening:    FHS-7:       6/2/2023     3:38 PM   Breast CA Risk Assessment (FHS-7)   Did any of your first-degree relatives have breast or ovarian cancer? Unknown   Did any of your relatives have bilateral breast cancer? Unknown   Did any man in your family have breast cancer? No   Did any woman in your family have breast and ovarian cancer? Unknown   Did any woman in your family have breast cancer before age 50 y? Unknown   Do you have 2 or more relatives with breast and/or ovarian cancer? No   Do you have 2 or more relatives with breast and/or bowel cancer? No       Patient under 40 years of age: Routine Mammogram Screening not recommended.   Pertinent mammograms are reviewed under the imaging tab.    History of abnormal Pap smear: NO - age 30-65 PAP every 5 years with negative HPV co-testing recommended      Latest Ref Rng & Units 5/7/2019     3:49 PM 5/7/2019     3:30 PM 11/30/2016    12:00 AM   PAP / HPV   PAP (Historical)  NIL    NIL     HPV 16 DNA NEG^Negative  Negative      HPV 18 DNA NEG^Negative  Negative      Other HR HPV NEG^Negative  Negative        Reviewed and updated as needed this visit by clinical staff   Tobacco  Allergies               Reviewed and updated as needed this visit by Provider                     Review of Systems   Constitutional: Negative for chills and fever.   HENT: Negative for congestion, ear pain, hearing loss and sore throat.    Eyes: Negative for pain and visual disturbance.   Respiratory: Negative for cough and shortness of breath.    Cardiovascular: Negative for chest pain, palpitations and peripheral edema.   Gastrointestinal: Negative for abdominal pain, constipation, diarrhea, heartburn, hematochezia and nausea.   Breasts:  Negative for tenderness, breast mass and discharge.   Genitourinary: Negative for dysuria,  "frequency, genital sores, hematuria, pelvic pain, urgency, vaginal bleeding and vaginal discharge.   Musculoskeletal: Negative for arthralgias, joint swelling and myalgias.   Skin: Negative for rash.   Neurological: Negative for dizziness, weakness, headaches and paresthesias.   Psychiatric/Behavioral: Negative for mood changes. The patient is not nervous/anxious.         OBJECTIVE:   BP (!) 142/82 (BP Location: Right arm, Patient Position: Sitting, Cuff Size: Adult Large)   Pulse 76   Temp 99.9  F (37.7  C) (Tympanic)   Resp 22   Ht 1.638 m (5' 4.5\")   Wt 116.5 kg (256 lb 12.8 oz)   SpO2 98%   BMI 43.40 kg/m    Physical Exam  GENERAL: healthy, alert and no distress  EYES: Eyes grossly normal to inspection, PERRL and conjunctivae and sclerae normal  HENT: ear canals and TM's normal, nose and mouth without ulcers or lesions  NECK: no adenopathy, no asymmetry, masses, or scars and thyroid normal to palpation  RESP: lungs clear to auscultation - no rales, rhonchi or wheezes  BREAST: normal without masses, tenderness or nipple discharge and no palpable axillary masses or adenopathy  CV: regular rate and rhythm, normal S1 S2, no S3 or S4, no murmur, click or rub, no peripheral edema and peripheral pulses strong  ABDOMEN: soft, nontender, no hepatosplenomegaly, no masses and bowel sounds normal  MS: no gross musculoskeletal defects noted, no edema  SKIN: no suspicious lesions or rashes  NEURO: Normal strength and tone, mentation intact and speech normal  PSYCH: mentation appears normal, affect normal/bright    Diagnostic Test Results:  Labs reviewed in Epic    ASSESSMENT/PLAN:   Gardenia was seen today for physical.    Diagnoses and all orders for this visit:    Routine general medical examination at a health care facility    Encounter for surveillance of contraceptive pills  -     norethindrone-ethinyl estradiol (DASETTA 1/35) 1-35 MG-MCG tablet; Take 1 tablet by mouth daily Skip placebo.    Need for hepatitis C " screening test in future    Morbid obesity (H)    Other orders  -     REVIEW OF HEALTH MAINTENANCE PROTOCOL ORDERS        Patient has been advised of split billing requirements and indicates understanding: Yes      COUNSELING:  Reviewed preventive health counseling, as reflected in patient instructions       Regular exercise       Healthy diet/nutrition       Vision screening       Osteoporosis prevention/bone health       Consider Hep C screening for all patients one time for ages 18-79 years        She reports that she has never smoked. She has never used smokeless tobacco.      Eb Suarez MD  [facility]

## 2023-06-02 NOTE — PATIENT INSTRUCTIONS
Plan fasting appt for next years physical    Arm cuff to check home BPs  Goal BP <140/<90 always, ideal <130/<80  -Check blood pressures a few times per week and write them down and bring them with to next clinic visit.    -Low salt diet 0545-0139 mg per day. DASH diet has sample menu plans.  -Regular moderate intensity exercise.      Noom silvana    Preventive Health Recommendations  Female Ages 26 - 39  Yearly exam:   See your health care provider every year in order to  Review health changes.   Discuss preventive care.    Review your medicines if you your doctor has prescribed any.    Until age 30: Get a Pap test every three years (more often if you have had an abnormal result).    After age 30: Talk to your doctor about whether you should have a Pap test every 3 years or have a Pap test with HPV screening every 5 years.   You do not need a Pap test if your uterus was removed (hysterectomy) and you have not had cancer.  You should be tested each year for STDs (sexually transmitted diseases), if you're at risk.   Talk to your provider about how often to have your cholesterol checked.  If you are at risk for diabetes, you should have a diabetes test (fasting glucose).  Shots: Get a flu shot each year. Get a tetanus shot every 10 years.   Nutrition:   Eat at least 5 servings of fruits and vegetables each day.  Eat whole-grain bread, whole-wheat pasta and brown rice instead of white grains and rice.  Get adequate Calcium and Vitamin D.     Lifestyle  Exercise at least 150 minutes a week (30 minutes a day, 5 days of the week). This will help you control your weight and prevent disease.  Limit alcohol to one drink per day.  No smoking.   Wear sunscreen to prevent skin cancer.  See your dentist every six months for an exam and cleaning.

## 2023-09-29 ENCOUNTER — OFFICE VISIT (OUTPATIENT)
Dept: FAMILY MEDICINE | Facility: CLINIC | Age: 33
End: 2023-09-29
Payer: COMMERCIAL

## 2023-09-29 VITALS
SYSTOLIC BLOOD PRESSURE: 134 MMHG | BODY MASS INDEX: 42.59 KG/M2 | WEIGHT: 252 LBS | RESPIRATION RATE: 16 BRPM | HEART RATE: 83 BPM | OXYGEN SATURATION: 99 % | TEMPERATURE: 99.7 F | DIASTOLIC BLOOD PRESSURE: 106 MMHG

## 2023-09-29 DIAGNOSIS — J18.9 PNEUMONIA OF LEFT LOWER LOBE DUE TO INFECTIOUS ORGANISM: Primary | ICD-10-CM

## 2023-09-29 PROCEDURE — 99213 OFFICE O/P EST LOW 20 MIN: CPT | Performed by: INTERNAL MEDICINE

## 2023-09-29 RX ORDER — AZITHROMYCIN 250 MG/1
TABLET, FILM COATED ORAL
Qty: 6 TABLET | Refills: 0 | Status: SHIPPED | OUTPATIENT
Start: 2023-09-29 | End: 2023-10-04

## 2023-09-29 RX ORDER — CEFDINIR 300 MG/1
300 CAPSULE ORAL 2 TIMES DAILY
Qty: 10 CAPSULE | Refills: 0 | Status: SHIPPED | OUTPATIENT
Start: 2023-09-29 | End: 2023-10-04

## 2023-09-29 ASSESSMENT — PAIN SCALES - GENERAL: PAINLEVEL: MILD PAIN (2)

## 2023-09-29 NOTE — PROGRESS NOTES
Assessment & Plan   Problem List Items Addressed This Visit    None  Visit Diagnoses       Pneumonia of left lower lobe due to infectious organism    -  Primary    Relevant Medications    azithromycin (ZITHROMAX) 250 MG tablet    cefdinir (OMNICEF) 300 MG capsule               Patient Instructions   Start antibiotic   If symptoms worsen, follow-up in clinic      --For cough - try Robitussin DM or Mucinex DM (Acitve ingredients Guaifenesin and dextromethorphan)  --For nasal congestion, try saline nasal spray (Ocean brand or similar.  NOT Afrin)  --For sore throat and cough, try Chloraseptic spray, cough drops, warm salt water gargles or tea with honey.    --Use a humidifier at night  --For body aches and pains and fever, try acetaminophen or ibuprofen        Megha Pan DO  [facility]    Tea Varner is a 33 year old, presenting for the following health issues:  URI        9/29/2023     2:07 PM   Additional Questions   Roomed by Pushpa MARTE   Accompanied by self       HPI       Acute Illness  Acute illness concerns: URI  Onset/Duration: 5 days 9/24 AM  Symptoms:  Fever: YES  Chills/Sweats: No  Headache (location?): YES  Sinus Pressure: No  Conjunctivitis:  No  Ear Pain: no  Rhinorrhea: No  Congestion: YES  Sore Throat: YES  Cough: YES  Wheeze: YES- when laying down   Decreased Appetite: No  Nausea: No  Vomiting: No  Diarrhea: No  Dysuria/Freq.: No  Dysuria or Hematuria: No  Fatigue/Achiness: YES  Sick/Strep Exposure: No  Therapies tried and outcome: OTC cold medicines weren't helping, Mucinex helped mildly  --sneezing, cough, headache, 'feels crackling in chest', occ shortness of breath, sore throat, low grade fevers  --took COVID test on 9/26 and was negative; cough started 1-2 days ago, after she already took a covid test.  --reports history of bronchitis and will get an inhaler      Current Outpatient Medications   Medication Sig Dispense Refill    norethindrone-ethinyl  estradiol (DASETTA 1/35) 1-35 MG-MCG tablet Take 1 tablet by mouth daily Skip placebo. 112 tablet 4           Review of Systems   Constitutional, HEENT, cardiovascular, pulmonary, gi and gu systems are negative, except as otherwise noted.      Objective    BP (!) 134/106   Pulse 83   Temp 99.7  F (37.6  C) (Tympanic)   Resp 16   Wt 114.3 kg (252 lb)   SpO2 99%   BMI 42.59 kg/m    Body mass index is 42.59 kg/m .  Physical Exam   GENERAL APPEARANCE: alert, no distress, fatigued, and skin warm and dry  EYES: Eyes grossly normal to inspection, PERRL, and conjunctivae and sclerae normal  HENT: ear canals and TM's normal and nose and mouth without ulcers or lesions. Tonsils mildly enlarged but this appears chronic and not acute.  No exudates  NECK: no adenopathy, no asymmetry, masses, or scars, and thyroid normal to palpation  RESP: crackles and wheezes in LLL and TALISHA, does not clear with coughing, normal right lung sounds  CV: regular rates and rhythm, normal S1 S2, no S3 or S4, and no murmur, click or rub

## 2023-09-29 NOTE — PATIENT INSTRUCTIONS
Start antibiotic   If symptoms worsen, follow-up in clinic      --For cough - try Robitussin DM or Mucinex DM (Acitve ingredients Guaifenesin and dextromethorphan)  --For nasal congestion, try saline nasal spray (Ocean brand or similar.  NOT Afrin)  --For sore throat and cough, try Chloraseptic spray, cough drops, warm salt water gargles or tea with honey.    --Use a humidifier at night  --For body aches and pains and fever, try acetaminophen or ibuprofen

## 2024-04-23 ENCOUNTER — OFFICE VISIT (OUTPATIENT)
Dept: FAMILY MEDICINE | Facility: CLINIC | Age: 34
End: 2024-04-23

## 2024-04-23 VITALS
RESPIRATION RATE: 20 BRPM | WEIGHT: 251 LBS | DIASTOLIC BLOOD PRESSURE: 88 MMHG | SYSTOLIC BLOOD PRESSURE: 148 MMHG | OXYGEN SATURATION: 98 % | HEIGHT: 65 IN | HEART RATE: 98 BPM | TEMPERATURE: 97.9 F | BODY MASS INDEX: 41.82 KG/M2

## 2024-04-23 DIAGNOSIS — H66.003 NON-RECURRENT ACUTE SUPPURATIVE OTITIS MEDIA OF BOTH EARS WITHOUT SPONTANEOUS RUPTURE OF TYMPANIC MEMBRANES: Primary | ICD-10-CM

## 2024-04-23 DIAGNOSIS — J02.9 SORE THROAT: ICD-10-CM

## 2024-04-23 DIAGNOSIS — H92.03 OTALGIA OF BOTH EARS: ICD-10-CM

## 2024-04-23 LAB
DEPRECATED S PYO AG THROAT QL EIA: NEGATIVE
GROUP A STREP BY PCR: NOT DETECTED

## 2024-04-23 PROCEDURE — 87651 STREP A DNA AMP PROBE: CPT | Performed by: FAMILY MEDICINE

## 2024-04-23 PROCEDURE — 99213 OFFICE O/P EST LOW 20 MIN: CPT | Performed by: FAMILY MEDICINE

## 2024-04-23 ASSESSMENT — PAIN SCALES - GENERAL: PAINLEVEL: NO PAIN (1)

## 2024-04-23 ASSESSMENT — ENCOUNTER SYMPTOMS
SORE THROAT: 1
COUGH: 1

## 2024-04-23 NOTE — PROGRESS NOTES
s  Assessment & Plan     Non-recurrent acute suppurative otitis media of both ears without spontaneous rupture of tympanic membranes  Discussed with patient findings on exam.  Discussed course and treatment of condition.  Antibiotic use discussed.  Analgesia: otc APAP prn pain. No NSAIDs due to hypertension.  Push oral fluids.  Return precautions discussed and given to patient.   - amoxicillin-clavulanate (AUGMENTIN) 875-125 MG tablet  Dispense: 20 tablet; Refill: 0    Sore throat  Advised patient negative strep screen. Wait for PCR.  Augmentin can treat strep as well.  - Streptococcus A Rapid Screen w/Reflex to PCR - Clinic Collect  - Group A Streptococcus PCR Throat Swab    Otalgia of both ears  - Streptococcus A Rapid Screen w/Reflex to PCR - Clinic Collect  - Group A Streptococcus PCR Throat Swab    Patient was advised to follow up with PCP as they have planned to do for her high blood pressure.  Patient to measure home BPs. Advised she discuss with PCP medical treatment if has persistently high numbers even at home.      Patient Instructions   Antibiotics has been given today due to bilateral ear infection.    Acetaminophen 500 mg orally 1-2 tabs every 4-6 hrs as needed for pain.    Oral fluids as tolerated.     If you develop fever more than 100.4 F, shortness of breath, severe headache or other worrisome symptoms, see doctor again.     Measure resting blood pressure at home at least once a day, and as needed if you have dizziness or other symptoms.  You may log measurements in a small notebook.    Goal blood pressure is to be below 140/90    Contact the care team if your blood pressures are frequently out of range or if you have any concerning symptoms.   See Dr. Suarez within the next 30 days for recheck on this.    Tea Varner is a 33 year old, presenting for the following health issues:  Ear Problem (X4 days, Pain, bilateral), Pharyngitis (Known exposure to strep), and Cough        4/23/2024      3:08 PM   Additional Questions   Roomed by Chari batres MA   Accompanied by self         4/23/2024     3:08 PM   Patient Reported Additional Medications   Patient reports taking the following new medications none     Via the Health Maintenance questionnaire, the patient has reported the following services have been completed -Cervical Cancer Screening, this information has been sent to the abstraction team.  History of Present Illness       Reason for visit:  Possible strep thorat. Infection in ear  Symptom onset:  1-3 days ago  Symptoms include:  Sorr throat, plugged painful ears, headache,. nausea  Symptom intensity:  Moderate  Symptom progression:  Staying the same  Had these symptoms before:  No  What makes it worse:  Not taking Sudafed  What makes it better:  Sudafed helps but doesnt fix    She eats 2-3 servings of fruits and vegetables daily.She consumes 1 sweetened beverage(s) daily.She exercises with enough effort to increase her heart rate 30 to 60 minutes per day.  She exercises with enough effort to increase her heart rate 4 days per week.   She is taking medications regularly.         Acute Illness  Acute illness concerns: sore throat, right ear very painful  Onset/Duration: 5 days  Symptoms:  Fever: no measured fever  Chills/Sweats: YES- chills initially  Headache (location?): YES- migraine  Sinus Pressure: YES- maxillary  Conjunctivitis:  No  Ear Pain: YES: right  Rhinorrhea: YES  Congestion: YES- nasal  Sore Throat: YES- more itchy and raw - mild - felt oinly when she swallows  Cough: YES-non-productive - mild  Wheeze: No  Decreased Appetite: slightly off  Nausea: mild  Vomiting: No  Diarrhea: YES- loose stools only yesterday  Dysuria/Freq.: No  Dysuria or Hematuria: No  Fatigue/Achiness: YES  Sick/Strep Exposure: YES- son - strep last week  Therapies tried and outcome: Ibu and sudafed- reduced the pressure in face and ears      Patient said her BPs usually goes down at end of clinic appointments. PCP  "aware of this - has follow up with her later this year.    Review of Systems  Constitutional, neuro, ENT, endocrine, pulmonary, cardiac, gastrointestinal, genitourinary, musculoskeletal, integument and psychiatric systems are negative, except as otherwise noted.      Objective    BP (!) 148/88   Pulse 98   Temp 97.9  F (36.6  C) (Tympanic)   Resp 20   Ht 1.651 m (5' 5\")   Wt 113.9 kg (251 lb)   SpO2 98%   BMI 41.77 kg/m    Body mass index is 41.77 kg/m .  Physical Exam   GENERAL: morbidly obese, ambulatory w/o assist alert and no distress  EYES: pink conjunctivae, no icterus  NECK: mild cervical adenopathy, nontender  HEENT:  nose with mild congestion, no sinus tenderness, throat mildly erythematous, no exudates, no oral ulcers  LEFT EAR: EAM clear, tympanic membrane moderately injected but intact, normal COL, mild effusion  RIGHT EAR: EAM clear, tympanic membrane moderatelu injected, intact but bulging, dull COL, mild effusion  RESP: lungs clear to auscultation - no rales, no rhonchi, no wheezes  CV: regular rates and rhythm, normal S1 S2, no S3 or S4 and no murmur  SKIN:  Good turgor, no rashes     Results for orders placed or performed in visit on 04/23/24   Streptococcus A Rapid Screen w/Reflex to PCR - Clinic Collect     Status: Normal    Specimen: Throat; Swab   Result Value Ref Range    Group A Strep antigen Negative Negative           Signed Electronically by: Caio Avelar MD    "

## 2024-04-23 NOTE — PATIENT INSTRUCTIONS
Antibiotics has been given today due to bilateral ear infection.    Acetaminophen 500 mg orally 1-2 tabs every 4-6 hrs as needed for pain.    Oral fluids as tolerated.     If you develop fever more than 100.4 F, shortness of breath, severe headache or other worrisome symptoms, see doctor again.     Measure resting blood pressure at home at least once a day, and as needed if you have dizziness or other symptoms.  You may log measurements in a small notebook.    Goal blood pressure is to be below 140/90    Contact the care team if your blood pressures are frequently out of range or if you have any concerning symptoms.   See Dr. Suarez within the next 30 days for recheck on this.

## 2024-05-03 ENCOUNTER — PATIENT OUTREACH (OUTPATIENT)
Dept: CARE COORDINATION | Facility: CLINIC | Age: 34
End: 2024-05-03
Payer: COMMERCIAL

## 2024-06-03 DIAGNOSIS — Z30.41 ENCOUNTER FOR SURVEILLANCE OF CONTRACEPTIVE PILLS: ICD-10-CM

## 2024-06-03 RX ORDER — NORETHINDRONE AND ETHINYL ESTRADIOL 1 MG-35MCG
1 KIT ORAL DAILY
Qty: 112 TABLET | Refills: 0 | Status: SHIPPED | OUTPATIENT
Start: 2024-06-03 | End: 2024-08-30

## 2024-06-13 DIAGNOSIS — Z30.41 ENCOUNTER FOR SURVEILLANCE OF CONTRACEPTIVE PILLS: ICD-10-CM

## 2024-06-13 RX ORDER — NORETHINDRONE AND ETHINYL ESTRADIOL 1 MG-35MCG
1 KIT ORAL DAILY
Qty: 112 TABLET | Refills: 0 | OUTPATIENT
Start: 2024-06-13

## 2024-06-13 NOTE — TELEPHONE ENCOUNTER
Pending Prescriptions:                       Disp   Refills    norethindrone-ethinyl estradiol (DASETTA *112 ta*0            Sig: Take 1 tablet by mouth daily Skip placebo.

## 2024-07-07 ENCOUNTER — HEALTH MAINTENANCE LETTER (OUTPATIENT)
Age: 34
End: 2024-07-07

## 2024-08-29 SDOH — HEALTH STABILITY: PHYSICAL HEALTH: ON AVERAGE, HOW MANY MINUTES DO YOU ENGAGE IN EXERCISE AT THIS LEVEL?: 30 MIN

## 2024-08-29 SDOH — HEALTH STABILITY: PHYSICAL HEALTH: ON AVERAGE, HOW MANY DAYS PER WEEK DO YOU ENGAGE IN MODERATE TO STRENUOUS EXERCISE (LIKE A BRISK WALK)?: 5 DAYS

## 2024-08-29 ASSESSMENT — SOCIAL DETERMINANTS OF HEALTH (SDOH): HOW OFTEN DO YOU GET TOGETHER WITH FRIENDS OR RELATIVES?: ONCE A WEEK

## 2024-08-30 ENCOUNTER — OFFICE VISIT (OUTPATIENT)
Dept: FAMILY MEDICINE | Facility: CLINIC | Age: 34
End: 2024-08-30
Payer: COMMERCIAL

## 2024-08-30 VITALS
HEART RATE: 93 BPM | DIASTOLIC BLOOD PRESSURE: 80 MMHG | SYSTOLIC BLOOD PRESSURE: 148 MMHG | TEMPERATURE: 98 F | BODY MASS INDEX: 41.79 KG/M2 | HEIGHT: 65 IN | WEIGHT: 250.8 LBS | OXYGEN SATURATION: 99 % | RESPIRATION RATE: 20 BRPM

## 2024-08-30 DIAGNOSIS — Z30.41 ENCOUNTER FOR SURVEILLANCE OF CONTRACEPTIVE PILLS: ICD-10-CM

## 2024-08-30 DIAGNOSIS — Z11.59 NEED FOR HEPATITIS C SCREENING TEST: ICD-10-CM

## 2024-08-30 DIAGNOSIS — E66.01 MORBID OBESITY (H): ICD-10-CM

## 2024-08-30 DIAGNOSIS — Z13.1 DIABETES MELLITUS SCREENING: ICD-10-CM

## 2024-08-30 DIAGNOSIS — Z23 NEED FOR DIPHTHERIA-TETANUS-PERTUSSIS (TDAP) VACCINE: ICD-10-CM

## 2024-08-30 DIAGNOSIS — Z00.00 ROUTINE GENERAL MEDICAL EXAMINATION AT A HEALTH CARE FACILITY: Primary | ICD-10-CM

## 2024-08-30 DIAGNOSIS — I10 BENIGN ESSENTIAL HYPERTENSION: ICD-10-CM

## 2024-08-30 DIAGNOSIS — Z13.220 SCREENING FOR LIPID DISORDERS: ICD-10-CM

## 2024-08-30 LAB
ANION GAP SERPL CALCULATED.3IONS-SCNC: 9 MMOL/L (ref 7–15)
BUN SERPL-MCNC: 10.1 MG/DL (ref 6–20)
CALCIUM SERPL-MCNC: 9.6 MG/DL (ref 8.8–10.4)
CHLORIDE SERPL-SCNC: 104 MMOL/L (ref 98–107)
CHOLEST SERPL-MCNC: 243 MG/DL
CREAT SERPL-MCNC: 0.87 MG/DL (ref 0.51–0.95)
EGFRCR SERPLBLD CKD-EPI 2021: 89 ML/MIN/1.73M2
FASTING STATUS PATIENT QL REPORTED: YES
FASTING STATUS PATIENT QL REPORTED: YES
GLUCOSE SERPL-MCNC: 105 MG/DL (ref 70–99)
HCO3 SERPL-SCNC: 25 MMOL/L (ref 22–29)
HCV AB SERPL QL IA: NONREACTIVE
HDLC SERPL-MCNC: 43 MG/DL
LDLC SERPL CALC-MCNC: 152 MG/DL
NONHDLC SERPL-MCNC: 200 MG/DL
POTASSIUM SERPL-SCNC: 4.1 MMOL/L (ref 3.4–5.3)
SODIUM SERPL-SCNC: 138 MMOL/L (ref 135–145)
TRIGL SERPL-MCNC: 240 MG/DL

## 2024-08-30 PROCEDURE — 90715 TDAP VACCINE 7 YRS/> IM: CPT | Performed by: FAMILY MEDICINE

## 2024-08-30 PROCEDURE — 80048 BASIC METABOLIC PNL TOTAL CA: CPT | Performed by: FAMILY MEDICINE

## 2024-08-30 PROCEDURE — 90471 IMMUNIZATION ADMIN: CPT | Performed by: FAMILY MEDICINE

## 2024-08-30 PROCEDURE — 99213 OFFICE O/P EST LOW 20 MIN: CPT | Mod: 25 | Performed by: FAMILY MEDICINE

## 2024-08-30 PROCEDURE — 86803 HEPATITIS C AB TEST: CPT | Performed by: FAMILY MEDICINE

## 2024-08-30 PROCEDURE — 99395 PREV VISIT EST AGE 18-39: CPT | Mod: 25 | Performed by: FAMILY MEDICINE

## 2024-08-30 PROCEDURE — 36415 COLL VENOUS BLD VENIPUNCTURE: CPT | Performed by: FAMILY MEDICINE

## 2024-08-30 PROCEDURE — 80061 LIPID PANEL: CPT | Performed by: FAMILY MEDICINE

## 2024-08-30 RX ORDER — NORETHINDRONE AND ETHINYL ESTRADIOL 1 MG-35MCG
1 KIT ORAL DAILY
Qty: 112 TABLET | Refills: 0 | Status: SHIPPED | OUTPATIENT
Start: 2024-08-30

## 2024-08-30 RX ORDER — LOSARTAN POTASSIUM 25 MG/1
25 TABLET ORAL DAILY
Qty: 90 TABLET | Refills: 3 | Status: SHIPPED | OUTPATIENT
Start: 2024-08-30

## 2024-08-30 ASSESSMENT — PAIN SCALES - GENERAL: PAINLEVEL: NO PAIN (0)

## 2024-08-30 NOTE — PATIENT INSTRUCTIONS
Start the losartan 25mg once a day at bedtime  We do need to stop that estrogen containing birth control due to the high blood pressures.   Plan to stop the birth control sometime in the next 3 months, I did send a short refill.  We could switch to the DEPO or the Mirena IUD.    Goal BP <140/<90 always, ideal <130/<80  -Check blood pressures a few times per week and write them down and bring them with to next clinic visit.    -Schedule a nurse BP recheck appt in 3 weeks and bring you home cuff and numbers along to compare.  -Low salt diet 1500mg per day. DASH diet has sample menu plans.  -Labs once per year to check kidneys and electrolytes.  -Regular moderate intensity exercise.      This is a preventative visit and any additional concerns or chronic disease management including medication refills addressed today could be charged additionally.    Preventative visits screen for diseases prior to they occur.  They do not cover for any new diagnosis or chronic disease management.     If you have questions regarding your coverage please check with your insurance provider.  At [facility] we need to code correctly to be in compliance with all insurance companies.    Patient Education   Preventive Care Advice   This is general advice given by our system to help you stay healthy. However, your care team may have specific advice just for you. Please talk to your care team about your preventive care needs.  Nutrition  Eat 5 or more servings of fruits and vegetables each day.  Try wheat bread, brown rice and whole grain pasta (instead of white bread, rice, and pasta).  Get enough calcium and vitamin D. Check the label on foods and aim for 100% of the RDA (recommended daily allowance).  Lifestyle  Exercise at least 150 minutes each week  (30 minutes a day, 5 days a week).  Do muscle strengthening activities 2 days a week. These help control your weight and prevent disease.  No smoking.  Wear sunscreen to prevent skin  cancer.  Have a dental exam and cleaning every 6 months.  Yearly exams  See your health care team every year to talk about:  Any changes in your health.  Any medicines your care team has prescribed.  Preventive care, family planning, and ways to prevent chronic diseases.  Shots (vaccines)   HPV shots (up to age 26), if you've never had them before.  Hepatitis B shots (up to age 59), if you've never had them before.  COVID-19 shot: Get this shot when it's due.  Flu shot: Get a flu shot every year.  Tetanus shot: Get a tetanus shot every 10 years.  Pneumococcal, hepatitis A, and RSV shots: Ask your care team if you need these based on your risk.  Shingles shot (for age 50 and up)  General health tests  Diabetes screening:  Starting at age 35, Get screened for diabetes at least every 3 years.  If you are younger than age 35, ask your care team if you should be screened for diabetes.  Cholesterol test: At age 39, start having a cholesterol test every 5 years, or more often if advised.  Bone density scan (DEXA): At age 50, ask your care team if you should have this scan for osteoporosis (brittle bones).  Hepatitis C: Get tested at least once in your life.  STIs (sexually transmitted infections)  Before age 24: Ask your care team if you should be screened for STIs.  After age 24: Get screened for STIs if you're at risk. You are at risk for STIs (including HIV) if:  You are sexually active with more than one person.  You don't use condoms every time.  You or a partner was diagnosed with a sexually transmitted infection.  If you are at risk for HIV, ask about PrEP medicine to prevent HIV.  Get tested for HIV at least once in your life, whether you are at risk for HIV or not.  Cancer screening tests  Cervical cancer screening: If you have a cervix, begin getting regular cervical cancer screening tests starting at age 21.  Breast cancer scan (mammogram): If you've ever had breasts, begin having regular mammograms starting at  age 40. This is a scan to check for breast cancer.  Colon cancer screening: It is important to start screening for colon cancer at age 45.  Have a colonoscopy test every 10 years (or more often if you're at risk) Or, ask your provider about stool tests like a FIT test every year or Cologuard test every 3 years.  To learn more about your testing options, visit:   .  For help making a decision, visit:   https://bit.ly/ae06030.  Prostate cancer screening test: If you have a prostate, ask your care team if a prostate cancer screening test (PSA) at age 55 is right for you.  Lung cancer screening: If you are a current or former smoker ages 50 to 80, ask your care team if ongoing lung cancer screenings are right for you.  For informational purposes only. Not to replace the advice of your health care provider. Copyright   2023 [employer]. All rights reserved. Clinically reviewed by the [facility]. [employer] 754876 - REV 01/24.  Substance Use Disorder: Care Instructions  Overview     You can improve your life and health by stopping your use of alcohol or drugs. When you don't drink or use drugs, you may feel and sleep better. You may get along better with your family, friends, and coworkers. There are medicines and programs that can help with substance use disorder.  How can you care for yourself at home?  Here are some ways to help you stay sober and prevent relapse.  If you have been given medicine to help keep you sober or reduce your cravings, be sure to take it exactly as prescribed.  Talk to your doctor about programs that can help you stop using drugs or drinking alcohol.  Do not keep alcohol or drugs in your home.  Plan ahead. Think about what you'll say if other people ask you to drink or use drugs. Try not to spend time with people who drink or use drugs.  Use the time and money spent on drinking or drugs to do something that's important to you.  Preventing a relapse  Have  a plan to deal with relapse. Learn to recognize changes in your thinking that lead you to drink or use drugs. Get help before you start to drink or use drugs again.  Try to stay away from situations, friends, or places that may lead you to drink or use drugs.  If you feel the need to drink alcohol or use drugs again, seek help right away. Call a trusted friend or family member. Some people get support from organizations such as Narcotics Anonymous or [place] or from treatment facilities.  If you relapse, get help as soon as you can. Some people make a plan with another person that outlines what they want that person to do for them if they relapse. The plan usually includes how to handle the relapse and who to notify in case of relapse.  Don't give up. Remember that a relapse doesn't mean that you have failed. Use the experience to learn the triggers that lead you to drink or use drugs. Then quit again. Recovery is a lifelong process. Many people have several relapses before they are able to quit for good.  Follow-up care is a key part of your treatment and safety. Be sure to make and go to all appointments, and call your doctor if you are having problems. It's also a good idea to know your test results and keep a list of the medicines you take.  When should you call for help?   Call 911  anytime you think you may need emergency care. For example, call if you or someone else:    Has overdosed or has withdrawal signs. Be sure to tell the emergency workers that you are or someone else is using or trying to quit using drugs. Overdose or withdrawal signs may include:  Losing consciousness.  Seizure.  Seeing or hearing things that aren't there (hallucinations).     Is thinking or talking about suicide or harming others.   Where to get help 24 hours a day, 7 days a week   If you or someone you know talks about suicide, self-harm, a mental health crisis, a substance use crisis, or any other kind of emotional  "distress, get help right away. You can:    Call the Suicide and Crisis Lifeline at 988.     Call 0-479-909-TALK (1-354.169.2046).     Text HOME to 776482 to access the Crisis Text Line.   Consider saving these numbers in your phone.  Go to [employer] for more information or to chat online.  Call your doctor now or seek immediate medical care if:    You are having withdrawal symptoms. These may include nausea or vomiting, sweating, shakiness, and anxiety.   Watch closely for changes in your health, and be sure to contact your doctor if:    You have a relapse.     You need more help or support to stop.   Where can you learn more?  Go to https://www.[employer].net/patiented  Enter H573 in the search box to learn more about \"Substance Use Disorder: Care Instructions.\"  Current as of: November 15, 2023               Content Version: 14.0    6807-1501 [employer].   Care instructions adapted under license by your healthcare professional. If you have questions about a medical condition or this instruction, always ask your healthcare professional. [employer] disclaims any warranty or liability for your use of this information.         "

## 2024-08-30 NOTE — PROGRESS NOTES
"Preventive Care Visit  [facility]  Eb Suarez MD, Family Medicine  Aug 30, 2024      Assessment & Plan     Routine general medical examination at a health care facility    Need for hepatitis C screening test  - Hepatitis C Screen Reflex to HCV RNA Quant and Genotype; Future    Morbid obesity (H)  Discussed has started healthy eating and exercise    Benign essential hypertension  New diagnosis  Discussed stopping estrogen containing birth control  Start losartan  RN BP recheck and labs in 2-3 weeks  - losartan (COZAAR) 25 MG tablet; Take 1 tablet (25 mg) by mouth daily.  - Basic metabolic panel  (Ca, Cl, CO2, Creat, Gluc, K, Na, BUN); Future  - **Basic metabolic panel FUTURE 14d; Future    Diabetes mellitus screening      Encounter for surveillance of contraceptive pills  - norethindrone-ethinyl estradiol (DASETTA 1/35) 1-35 MG-MCG tablet; Take 1 tablet by mouth daily. Skip placebo.    Need for diphtheria-tetanus-pertussis (Tdap) vaccine  - TDAP 10-64Y (ADACEL,BOOSTRIX)    Screening for lipid disorders  - Lipid panel reflex to direct LDL Fasting; Future    Patient has been advised of split billing requirements and indicates understanding: Yes        BMI  Estimated body mass index is 41.74 kg/m  as calculated from the following:    Height as of this encounter: 1.651 m (5' 5\").    Weight as of this encounter: 113.8 kg (250 lb 12.8 oz).   Weight management plan: Discussed healthy diet and exercise guidelines    Counseling  Appropriate preventive services were addressed with this patient via screening, questionnaire, or discussion as appropriate for fall prevention, nutrition, physical activity, Tobacco-use cessation, social engagement, weight loss and cognition.  Checklist reviewing preventive services available has been given to the patient.  Reviewed patient's diet, addressing concerns and/or questions.   The patient was instructed to see the dentist every 6 months.       See " Patient Instructions    Subjective   Gardenia is a 33 year old, presenting for the following:  Physical        8/30/2024     8:36 AM   Additional Questions   Roomed by Merissa Carter CMA   Accompanied by self        Health Care Directive  Patient does not have a Health Care Directive or Living Will: Discussed advance care planning with patient; information given to patient to review.    HPI  Blood pressure: worried about it since dad in hospital with CHF.  At home BPs at 140s systolics  Has done switch to less salt  Doing 1-2 mile walk with dog a day.          8/29/2024   General Health   How would you rate your overall physical health? (!) DECLINE   Feel stress (tense, anxious, or unable to sleep) To some extent      (!) STRESS CONCERN      8/29/2024   Nutrition   Three or more servings of calcium each day? (!) I DON'T KNOW   Diet: Low salt   How many servings of fruit and vegetables per day? (!) 2-3   How many sweetened beverages each day? 0-1            8/29/2024   Exercise   Days per week of moderate/strenous exercise 5 days   Average minutes spent exercising at this level 30 min            8/29/2024   Social Factors   Frequency of gathering with friends or relatives Once a week   Worry food won't last until get money to buy more No   Food not last or not have enough money for food? No   Do you have housing? (Housing is defined as stable permanent housing and does not include staying ouside in a car, in a tent, in an abandoned building, in an overnight shelter, or couch-surfing.) Yes   Are you worried about losing your housing? No   Lack of transportation? No   Unable to get utilities (heat,electricity)? No            8/29/2024   Dental   Dentist two times every year? (!) NO            8/29/2024   TB Screening   Were you born outside of the [place]? No              Today's PHQ-2 Score:       8/30/2024     8:31 AM   PHQ-2 ( 1999 [employer])   Q1: Little interest or pleasure in doing things 0   Q2: Feeling down,  depressed or hopeless 0   PHQ-2 Score 0   Q1: Little interest or pleasure in doing things Not at all   Q2: Feeling down, depressed or hopeless Not at all   PHQ-2 Score 0         8/29/2024   Substance Use   Alcohol more than 3/day or more than 7/wk No   Do you use any other substances recreationally? (!) CANNABIS PRODUCTS        Social History     Tobacco Use    Smoking status: Never    Smokeless tobacco: Never   Vaping Use    Vaping status: Never Used   Substance Use Topics    Alcohol use: Not Currently    Drug use: Yes     Types: Marijuana     Comment: one bowl a day           6/2/2023   LAST FHS-7 RESULTS   1st degree relative breast or ovarian cancer Unknown   Any relative bilateral breast cancer Unknown   Any male have breast cancer No   Any ONE woman have BOTH breast AND ovarian cancer Unknown   Any woman with breast cancer before 50yrs Unknown   2 or more relatives with breast AND/OR ovarian cancer No   2 or more relatives with breast AND/OR bowel cancer No           Mammogram Screening - Patient under 40 years of age: Routine Mammogram Screening not recommended.         8/29/2024   STI Screening   New sexual partner(s) since last STI/HIV test? No        History of abnormal Pap smear: No - age 30- 64 PAP with HPV every 5 years recommended        Latest Ref Rng & Units 5/7/2019     3:49 PM 5/7/2019     3:30 PM 11/30/2016    12:00 AM   PAP / HPV   PAP (Historical)  NIL   NIL    HPV 16 DNA NEG^Negative  Negative     HPV 18 DNA NEG^Negative  Negative     Other HR HPV NEG^Negative  Negative             8/29/2024   Contraception/Family Planning   Questions about contraception or family planning No           Reviewed and updated as needed this visit by Provider                    BP Readings from Last 3 Encounters:   08/30/24 (!) 148/80   04/23/24 (!) 148/88   09/29/23 (!) 134/106    Wt Readings from Last 3 Encounters:   08/30/24 113.8 kg (250 lb 12.8 oz)   04/23/24 113.9 kg (251 lb)   09/29/23 114.3 kg (252 lb)     "           Review of Systems  Constitutional, HEENT, cardiovascular, pulmonary, gi and gu systems are negative, except as otherwise noted.     Objective    Exam  BP (!) 148/80   Pulse 93   Temp 98  F (36.7  C) (Tympanic)   Resp 20   Ht 1.651 m (5' 5\")   Wt 113.8 kg (250 lb 12.8 oz)   SpO2 99%   BMI 41.74 kg/m     Estimated body mass index is 41.74 kg/m  as calculated from the following:    Height as of this encounter: 1.651 m (5' 5\").    Weight as of this encounter: 113.8 kg (250 lb 12.8 oz).    Physical Exam  GENERAL: alert and no distress  NECK: no adenopathy, no asymmetry, masses, or scars  RESP: lungs clear to auscultation - no rales, rhonchi or wheezes  CV: regular rate and rhythm, normal S1 S2, no S3 or S4, no murmur, click or rub, no peripheral edema  ABDOMEN: soft, nontender, no hepatosplenomegaly, no masses and bowel sounds normal  MS: no gross musculoskeletal defects noted, no edema  PSYCH: mentation appears normal, affect normal/bright        Signed Electronically by: Eb Suarez MD    "

## 2024-08-30 NOTE — NURSING NOTE
Prior to immunization administration, verified patients identity using patient s name and date of birth. Please see Immunization Activity for additional information.     Screening Questionnaire for Adult Immunization    Are you sick today?   No   Do you have allergies to medications, food, a vaccine component or latex?   No   Have you ever had a serious reaction after receiving a vaccination?   No   Do you have a long-term health problem with heart, lung, kidney, or metabolic disease (e.g., diabetes), asthma, a blood disorder, no spleen, complement component deficiency, a cochlear implant, or a spinal fluid leak?  Are you on long-term aspirin therapy?   No   Do you have cancer, leukemia, HIV/AIDS, or any other immune system problem?   No   Do you have a parent, brother, or sister with an immune system problem?   No   In the past 3 months, have you taken medications that affect  your immune system, such as prednisone, other steroids, or anticancer drugs; drugs for the treatment of rheumatoid arthritis, Crohn s disease, or psoriasis; or have you had radiation treatments?   No   Have you had a seizure, or a brain or other nervous system problem?   No   During the past year, have you received a transfusion of blood or blood    products, or been given immune (gamma) globulin or antiviral drug?   No   For women: Are you pregnant or is there a chance you could become       pregnant during the next month?   No   Have you received any vaccinations in the past 4 weeks?   No     Immunization questionnaire answers were all negative.      Patient instructed to remain in clinic for 15 minutes afterwards, and to report any adverse reactions.     Screening performed by Merissa Carter MA on 8/30/2024 at 9:32 AM.

## 2024-09-20 ENCOUNTER — OFFICE VISIT (OUTPATIENT)
Dept: FAMILY MEDICINE | Facility: CLINIC | Age: 34
End: 2024-09-20
Payer: COMMERCIAL

## 2024-09-20 VITALS
BODY MASS INDEX: 40.44 KG/M2 | DIASTOLIC BLOOD PRESSURE: 76 MMHG | RESPIRATION RATE: 16 BRPM | SYSTOLIC BLOOD PRESSURE: 110 MMHG | HEART RATE: 100 BPM | WEIGHT: 243 LBS | OXYGEN SATURATION: 98 % | TEMPERATURE: 99.9 F

## 2024-09-20 DIAGNOSIS — I10 BENIGN ESSENTIAL HYPERTENSION: ICD-10-CM

## 2024-09-20 DIAGNOSIS — R73.01 IMPAIRED FASTING GLUCOSE: Primary | ICD-10-CM

## 2024-09-20 PROCEDURE — 99213 OFFICE O/P EST LOW 20 MIN: CPT | Performed by: FAMILY MEDICINE

## 2024-09-20 PROCEDURE — G2211 COMPLEX E/M VISIT ADD ON: HCPCS | Performed by: FAMILY MEDICINE

## 2024-09-20 ASSESSMENT — PAIN SCALES - GENERAL: PAINLEVEL: NO PAIN (0)

## 2024-09-20 NOTE — PROGRESS NOTES
Assessment & Plan     Benign essential hypertension  Stable, continue current medications and habit changes with walking.    Impaired fasting glucose: making changes, will monitor glucose and A1c in a year    The longitudinal plan of care for the diagnosis(es)/condition(s) as documented were addressed during this visit. Due to the added complexity in care, I will continue to support Gardenia in the subsequent management and with ongoing continuity of care.    See Patient Instructions    Subjective   Gardenia is a 34 year old, presenting for the following health issues:  Hypertension        9/20/2024     3:06 PM   Additional Questions   Roomed by Lili ZAMORA   Accompanied by Self         9/20/2024     3:06 PM   Patient Reported Additional Medications   Patient reports taking the following new medications .     History of Present Illness       Hypertension: She presents for follow up of hypertension.  She does not check blood pressure  regularly outside of the clinic. Outpatient blood pressures have not been over 140/90. She follows a low salt diet.     She eats 2-3 servings of fruits and vegetables daily.She consumes 0 sweetened beverage(s) daily.She exercises with enough effort to increase her heart rate 30 to 60 minutes per day.  She exercises with enough effort to increase her heart rate 5 days per week.   She is taking medications regularly.     Wt Readings from Last 4 Encounters:   09/20/24 110.2 kg (243 lb)   08/30/24 113.8 kg (250 lb 12.8 oz)   04/23/24 113.9 kg (251 lb)   09/29/23 114.3 kg (252 lb)     No orthostasis.    Pre-diabetes: decreasing carbs. Stopped soda.    Review of Systems  Constitutional, HEENT, cardiovascular, pulmonary, gi and gu systems are negative, except as otherwise noted.      Objective    /76   Pulse 100   Temp 99.9  F (37.7  C) (Tympanic)   Resp 16   Wt 110.2 kg (243 lb)   SpO2 98%   BMI 40.44 kg/m    Body mass index is 40.44 kg/m .  Physical Exam   GENERAL: alert and no  distress  RESP: lungs clear to auscultation - no rales, rhonchi or wheezes  CV: regular rate and rhythm, normal S1 S2, no S3 or S4, no murmur, click or rub, no peripheral edema  MS: no gross musculoskeletal defects noted, no edema          Signed Electronically by: Eb Suarez MD

## 2024-11-24 ENCOUNTER — MYC REFILL (OUTPATIENT)
Dept: FAMILY MEDICINE | Facility: CLINIC | Age: 34
End: 2024-11-24
Payer: COMMERCIAL

## 2024-11-24 DIAGNOSIS — I10 BENIGN ESSENTIAL HYPERTENSION: ICD-10-CM

## 2024-11-25 RX ORDER — LOSARTAN POTASSIUM 25 MG/1
25 TABLET ORAL DAILY
Qty: 90 TABLET | Refills: 3 | OUTPATIENT
Start: 2024-11-25

## 2024-12-29 ENCOUNTER — MYC REFILL (OUTPATIENT)
Dept: FAMILY MEDICINE | Facility: CLINIC | Age: 34
End: 2024-12-29
Payer: COMMERCIAL

## 2024-12-29 DIAGNOSIS — Z30.41 ENCOUNTER FOR SURVEILLANCE OF CONTRACEPTIVE PILLS: ICD-10-CM

## 2024-12-30 RX ORDER — NORETHINDRONE AND ETHINYL ESTRADIOL 1 MG-35MCG
1 KIT ORAL DAILY
Qty: 112 TABLET | Refills: 2 | Status: SHIPPED | OUTPATIENT
Start: 2024-12-30

## 2025-02-20 ENCOUNTER — E-VISIT (OUTPATIENT)
Dept: FAMILY MEDICINE | Facility: CLINIC | Age: 35
End: 2025-02-20
Payer: COMMERCIAL

## 2025-02-20 DIAGNOSIS — Z20.818 PERTUSSIS EXPOSURE: Primary | ICD-10-CM

## 2025-02-20 RX ORDER — AZITHROMYCIN 250 MG/1
TABLET, FILM COATED ORAL
Qty: 6 TABLET | Refills: 0 | Status: SHIPPED | OUTPATIENT
Start: 2025-02-20 | End: 2025-02-25

## 2025-02-20 NOTE — PATIENT INSTRUCTIONS
Thank you for choosing us for your care. I have placed an order for a prescription so that you can start treatment to prevent pertussis (whooping cough):  Orders Placed This Encounter   Medications    azithromycin (ZITHROMAX) 250 MG tablet     Sig: Take 2 tablets (500 mg) by mouth daily for 1 day, THEN 1 tablet (250 mg) daily for 4 days.     Dispense:  6 tablet     Refill:  0        View your full visit summary for details by clicking on the link below. Your pharmacist will able to address any questions you may have about the medication.     If you have concerns, please schedule an appointment.  You can schedule an appointment right here in [facility], or call [phone]  If the visit is for the same symptoms as your eVisit, we'll refund the cost of your eVisit if seen within seven days.

## 2025-06-11 ENCOUNTER — ANCILLARY PROCEDURE (OUTPATIENT)
Dept: GENERAL RADIOLOGY | Facility: CLINIC | Age: 35
End: 2025-06-11
Attending: PODIATRIST
Payer: COMMERCIAL

## 2025-06-11 ENCOUNTER — OFFICE VISIT (OUTPATIENT)
Dept: PODIATRY | Facility: CLINIC | Age: 35
End: 2025-06-11
Payer: COMMERCIAL

## 2025-06-11 VITALS — HEIGHT: 65 IN | WEIGHT: 239.5 LBS | BODY MASS INDEX: 39.9 KG/M2

## 2025-06-11 DIAGNOSIS — M72.2 PLANTAR FASCIITIS, LEFT: Primary | ICD-10-CM

## 2025-06-11 PROCEDURE — 73650 X-RAY EXAM OF HEEL: CPT | Mod: TC | Performed by: INTERNAL MEDICINE

## 2025-06-11 PROCEDURE — 99203 OFFICE O/P NEW LOW 30 MIN: CPT | Performed by: PODIATRIST

## 2025-06-11 PROCEDURE — 1125F AMNT PAIN NOTED PAIN PRSNT: CPT | Performed by: PODIATRIST

## 2025-06-11 ASSESSMENT — PAIN SCALES - GENERAL: PAINLEVEL_OUTOF10: MODERATE PAIN (6)

## 2025-06-11 NOTE — PATIENT INSTRUCTIONS

## 2025-06-11 NOTE — PROGRESS NOTES
PATIENT HISTORY:  Gardenia Marcano is a 34 year old female who presents to clinic  with a chief complaint of left foot pain.  The patient is seen by themselves.  The patient relates the pain is primarily located around the bottom of the left heel.  Reports insidious onset without acute precipitating event. that has been going on for several week(s).  The patient has previously tried different shoes with little relief.  Any previous notes and studies that pertain to the patient's condition were reviewed.    Pertinent medical, surgical and family history was reviewed in the Epic chart.    Past Medical History:   Past Medical History:   Diagnosis Date    Chickenpox        Medications:   Current Outpatient Medications:     losartan (COZAAR) 25 MG tablet, Take 1 tablet (25 mg) by mouth daily., Disp: 90 tablet, Rfl: 3    norethindrone-ethinyl estradiol (DASETTA 1/35) 1-35 MG-MCG tablet, Take 1 tablet by mouth daily. Skip placebo., Disp: 112 tablet, Rfl: 2     Allergies:  No Known Allergies      LOWER EXTREMITY PHYSICAL EXAM    Dermatologic: Skin is intact to left lower extremity without significant lesions, rash or abrasion.        Vascular: DP & PT pulses are intact & regular on the left.   CFT and skin temperature is normal to the left lower extremity.     Neurologic: Lower extremity sensation is intact to light touch.  No evidence of weakness in the left lower extremity.        Musculoskeletal: Patient is ambulatory without assistive device or brace.  No gross ankle deformity noted.  No foot or ankle joint effusion is noted.  Noted pain on palpation on the plantar aspect of the left heel near the insertion point of the plantar fascia.  No surrounding erythema or edema noted.  Noted tight gastroc complex on the left.    Diagnostics:  Radiographs included three views of the left foot demonstrating a plantar calcaneal spur with no cortical erosions or periosteal elevation.  All joint margins appear stable.  There is no  apparent fracture or tumor formation noted.  There is no evidence of foreign body.  The images were independently reviewed by myself along with the patient explaining the findings.      ASSESSMENT / PLAN:     ICD-10-CM    1. Plantar fasciitis, left  M72.2 XR Calcaneus Left G/E 2 Views          I have explained to Gardenia about the conditions.  We discussed the underlying contributing factors to the condition as well as treatment options along with expected length of recovery.  At this time, the patient was educated on the importance of offloading supportive shoes and other devices.  I demonstrated to the patient calf stretches to perform every hour daily until symptoms resolve.  After symptoms resolve, the patient was advised to perform the stretches 3 times daily to prevent future recurrence.  The patient was instructed to perform warm soaks with Epson salt after which to also apply over-the-counter Voltaren gel to deeply massage the injured tissue.  The patient was instructed to do this on a daily basis until symptoms resolve.   The patient may return in four weeks for reevaluation to determine if any further treatment will be needed.    Gardenia verbalized agreement with and understanding of the rational for the diagnosis and treatment plan.  All questions were answered to best of my ability and the patient's satisfaction. The patient was advised to contact the clinic with any questions that may arise after the clinic visit.      Disclaimer: This note consists of symbols derived from keyboarding, dictation and/or voice recognition software. As a result, there may be errors in the script that have gone undetected. Please consider this when interpreting information found in this chart.       WADE June D.P.M., F.LOUISA.C.[email or web address].S.

## 2025-06-11 NOTE — LETTER
6/11/2025      Gardenia Marcano  [address]  [facility] [place] [address]      Dear Colleague,    Thank you for referring your patient, Gardenia Marcano, to the [facility]. Please see a copy of my visit note below.    PATIENT HISTORY:  Gardenia Marcano is a 34 year old female who presents to clinic  with a chief complaint of left foot pain.  The patient is seen by themselves.  The patient relates the pain is primarily located around the bottom of the left heel.  Reports insidious onset without acute precipitating event. that has been going on for several week(s).  The patient has previously tried different shoes with little relief.  Any previous notes and studies that pertain to the patient's condition were reviewed.    Pertinent medical, surgical and family history was reviewed in the Epic chart.    Past Medical History:   Past Medical History:   Diagnosis Date     Chickenpox        Medications:   Current Outpatient Medications:      losartan (COZAAR) 25 MG tablet, Take 1 tablet (25 mg) by mouth daily., Disp: 90 tablet, Rfl: 3     norethindrone-ethinyl estradiol (DASETTA 1/35) 1-35 MG-MCG tablet, Take 1 tablet by mouth daily. Skip placebo., Disp: 112 tablet, Rfl: 2     Allergies:  No Known Allergies      LOWER EXTREMITY PHYSICAL EXAM    Dermatologic: Skin is intact to left lower extremity without significant lesions, rash or abrasion.        Vascular: DP & PT pulses are intact & regular on the left.   CFT and skin temperature is normal to the left lower extremity.     Neurologic: Lower extremity sensation is intact to light touch.  No evidence of weakness in the left lower extremity.        Musculoskeletal: Patient is ambulatory without assistive device or brace.  No gross ankle deformity noted.  No foot or ankle joint effusion is noted.  Noted pain on palpation on the plantar aspect of the left heel near the insertion point of the plantar fascia.  No surrounding erythema or edema  noted.  Noted tight gastroc complex on the left.    Diagnostics:  Radiographs included three views of the left foot demonstrating a plantar calcaneal spur with no cortical erosions or periosteal elevation.  All joint margins appear stable.  There is no apparent fracture or tumor formation noted.  There is no evidence of foreign body.  The images were independently reviewed by myself along with the patient explaining the findings.      ASSESSMENT / PLAN:     ICD-10-CM    1. Plantar fasciitis, left  M72.2 XR Calcaneus Left G/E 2 Views          I have explained to Gardenia about the conditions.  We discussed the underlying contributing factors to the condition as well as treatment options along with expected length of recovery.  At this time, the patient was educated on the importance of offloading supportive shoes and other devices.  I demonstrated to the patient calf stretches to perform every hour daily until symptoms resolve.  After symptoms resolve, the patient was advised to perform the stretches 3 times daily to prevent future recurrence.  The patient was instructed to perform warm soaks with Epson salt after which to also apply over-the-counter Voltaren gel to deeply massage the injured tissue.  The patient was instructed to do this on a daily basis until symptoms resolve.   The patient may return in four weeks for reevaluation to determine if any further treatment will be needed.    Gardenia verbalized agreement with and understanding of the rational for the diagnosis and treatment plan.  All questions were answered to best of my ability and the patient's satisfaction. The patient was advised to contact the clinic with any questions that may arise after the clinic visit.      Disclaimer: This note consists of symbols derived from keyboarding, dictation and/or voice recognition software. As a result, there may be errors in the script that have gone undetected. Please consider this when interpreting information  found in this chart.       WADE June D.P.M., F.KRISTEN.F.A.S.      Again, thank you for allowing me to participate in the care of your patient.        Sincerely,        Dejon June DPM    Electronically signed

## 2025-06-11 NOTE — NURSING NOTE
"Chief Complaint   Patient presents with    Consult     Heel pain- resting - left heel medial side of heel       Initial Ht 1.651 m (5' 5\")   Wt 108.6 kg (239 lb 8 oz)   BMI 39.85 kg/m   Estimated body mass index is 39.85 kg/m  as calculated from the following:    Height as of this encounter: 1.651 m (5' 5\").    Weight as of this encounter: 108.6 kg (239 lb 8 oz).  Medications and allergies reviewed.      Stephany FRAUSTO MA    "

## 2025-07-31 ENCOUNTER — PATIENT OUTREACH (OUTPATIENT)
Dept: CARE COORDINATION | Facility: CLINIC | Age: 35
End: 2025-07-31
Payer: COMMERCIAL

## 2025-08-14 ENCOUNTER — PATIENT OUTREACH (OUTPATIENT)
Dept: CARE COORDINATION | Facility: CLINIC | Age: 35
End: 2025-08-14
Payer: COMMERCIAL

## 2025-08-19 DIAGNOSIS — I10 BENIGN ESSENTIAL HYPERTENSION: ICD-10-CM

## 2025-08-20 RX ORDER — LOSARTAN POTASSIUM 25 MG/1
25 TABLET ORAL DAILY
Qty: 90 TABLET | Refills: 2 | Status: SHIPPED | OUTPATIENT
Start: 2025-08-20

## 2025-08-31 ENCOUNTER — MYC REFILL (OUTPATIENT)
Dept: FAMILY MEDICINE | Facility: CLINIC | Age: 35
End: 2025-08-31
Payer: COMMERCIAL

## 2025-08-31 DIAGNOSIS — I10 BENIGN ESSENTIAL HYPERTENSION: ICD-10-CM

## 2025-08-31 DIAGNOSIS — Z30.41 ENCOUNTER FOR SURVEILLANCE OF CONTRACEPTIVE PILLS: ICD-10-CM

## 2025-09-02 RX ORDER — NORETHINDRONE AND ETHINYL ESTRADIOL 1 MG-35MCG
1 KIT ORAL DAILY
Qty: 112 TABLET | Refills: 2 | Status: SHIPPED | OUTPATIENT
Start: 2025-09-02

## 2025-09-02 RX ORDER — LOSARTAN POTASSIUM 25 MG/1
25 TABLET ORAL DAILY
Qty: 90 TABLET | Refills: 2 | OUTPATIENT
Start: 2025-09-02